# Patient Record
Sex: FEMALE | Employment: OTHER | URBAN - METROPOLITAN AREA
[De-identification: names, ages, dates, MRNs, and addresses within clinical notes are randomized per-mention and may not be internally consistent; named-entity substitution may affect disease eponyms.]

---

## 2019-12-17 ENCOUNTER — TRANSFERRED RECORDS (OUTPATIENT)
Dept: HEALTH INFORMATION MANAGEMENT | Facility: CLINIC | Age: 79
End: 2019-12-17

## 2019-12-18 ENCOUNTER — TRANSFERRED RECORDS (OUTPATIENT)
Dept: HEALTH INFORMATION MANAGEMENT | Facility: CLINIC | Age: 79
End: 2019-12-18

## 2020-01-15 ENCOUNTER — TRANSFERRED RECORDS (OUTPATIENT)
Dept: HEALTH INFORMATION MANAGEMENT | Facility: CLINIC | Age: 80
End: 2020-01-15

## 2020-01-23 ENCOUNTER — TRANSCRIBE ORDERS (OUTPATIENT)
Dept: OTHER | Age: 80
End: 2020-01-23

## 2020-01-23 DIAGNOSIS — C54.1 ENDOMETRIAL CANCER (H): Primary | ICD-10-CM

## 2020-01-23 NOTE — TELEPHONE ENCOUNTER
ONCOLOGY INTAKE: Records Information      APPT INFORMATION: [id number] Dr. Yuliet Wilburn  Referring provider:  Dr. Abby Parekh  Referring provider s clinic:  [facility]  Reason for visit/diagnosis:  Endometrial cancer (H) [C54.1]  Has patient been notified of appointment date and time?: Yes    RECORDS INFORMATION:  Were the records received with the referral (via [employer])? Yes    Has patient been seen for any external appt for this diagnosis? Yes    If yes, where? [facility]    Has patient had any imaging or procedures outside of [place] for this condition? Yes      If Yes, where? [facility]    ADDITIONAL INFORMATION:  None

## 2020-01-24 NOTE — TELEPHONE ENCOUNTER
Action    Action Taken January 24, 2020  Call to PT's daughter Yesica and she stated that all records are at [facility] in [place]  [phone]  Call to Danii, need to send the request to F# [phone]     RECORDS STATUS - ALL OTHER DIAGNOSIS      RECORDS RECEIVED FROM: [facility]   DATE RECEIVED: 1/24/20   NOTES STATUS DETAILS   OFFICE NOTE from referring provider Requesting 1/24/20  Received 1/28/20 [facility]   OFFICE NOTE from medical oncologist     DISCHARGE SUMMARY from hospital     DISCHARGE REPORT from the ER Received 1/28/20 [facility]   OPERATIVE REPORT Received 1/28/20 [facility]   MEDICATION LIST     CLINICAL TRIAL TREATMENTS TO DATE     LABS Report Received 1/28/20 [facility]   PATHOLOGY REPORTS     ANYTHING RELATED TO DIAGNOSIS     GENONOMIC TESTING     TYPE:     IMAGING (NEED IMAGES & REPORT)     CT SCANS     MRI     MAMMO     ULTRASOUND Report Received 1/28/20 [facility]   PET

## 2020-02-11 ENCOUNTER — ONCOLOGY VISIT (OUTPATIENT)
Dept: ONCOLOGY | Facility: CLINIC | Age: 80
End: 2020-02-11
Attending: OBSTETRICS & GYNECOLOGY
Payer: COMMERCIAL

## 2020-02-11 ENCOUNTER — PRE VISIT (OUTPATIENT)
Dept: ONCOLOGY | Facility: CLINIC | Age: 80
End: 2020-02-11

## 2020-02-11 ENCOUNTER — TELEPHONE (OUTPATIENT)
Dept: ONCOLOGY | Facility: CLINIC | Age: 80
End: 2020-02-11

## 2020-02-11 VITALS
DIASTOLIC BLOOD PRESSURE: 62 MMHG | HEART RATE: 65 BPM | WEIGHT: 195.9 LBS | BODY MASS INDEX: 36.05 KG/M2 | RESPIRATION RATE: 14 BRPM | TEMPERATURE: 98.2 F | HEIGHT: 62 IN | SYSTOLIC BLOOD PRESSURE: 139 MMHG | OXYGEN SATURATION: 97 %

## 2020-02-11 DIAGNOSIS — C54.1 ENDOMETRIAL CANCER (H): ICD-10-CM

## 2020-02-11 PROBLEM — R29.6 FREQUENT FALLS: Status: ACTIVE | Noted: 2017-09-11

## 2020-02-11 PROBLEM — J18.9 PNEUMONIA DUE TO INFECTIOUS ORGANISM: Status: ACTIVE | Noted: 2019-09-09

## 2020-02-11 PROBLEM — F03.B0 MODERATE DEMENTIA WITHOUT BEHAVIORAL DISTURBANCE (H): Status: ACTIVE | Noted: 2019-04-17

## 2020-02-11 PROBLEM — G47.09 OTHER INSOMNIA: Status: ACTIVE | Noted: 2019-04-17

## 2020-02-11 PROBLEM — F32.1 MODERATE SINGLE CURRENT EPISODE OF MAJOR DEPRESSIVE DISORDER (H): Status: ACTIVE | Noted: 2017-08-16

## 2020-02-11 PROBLEM — F01.50: Status: ACTIVE | Noted: 2019-07-11

## 2020-02-11 PROBLEM — R22.1 NECK SWELLING: Status: ACTIVE | Noted: 2020-02-11

## 2020-02-11 PROCEDURE — 99205 OFFICE O/P NEW HI 60 MIN: CPT | Mod: ZP | Performed by: OBSTETRICS & GYNECOLOGY

## 2020-02-11 PROCEDURE — G0463 HOSPITAL OUTPT CLINIC VISIT: HCPCS | Mod: ZF

## 2020-02-11 RX ORDER — PREDNISONE 1 MG/1
1 TABLET ORAL EVERY MORNING
COMMUNITY
Start: 2019-11-25

## 2020-02-11 RX ORDER — DONEPEZIL HYDROCHLORIDE 10 MG/1
10 TABLET, FILM COATED ORAL EVERY EVENING
COMMUNITY
Start: 2019-10-28

## 2020-02-11 RX ORDER — ALBUTEROL SULFATE 90 UG/1
2 AEROSOL, METERED RESPIRATORY (INHALATION) EVERY 4 HOURS PRN
COMMUNITY
Start: 2018-04-25

## 2020-02-11 RX ORDER — FAMOTIDINE 20 MG/1
TABLET, FILM COATED ORAL
Status: ON HOLD | COMMUNITY
Start: 2020-01-19 | End: 2020-03-05

## 2020-02-11 RX ORDER — NEOMYCIN SULFATE, POLYMYXIN B SULFATE AND HYDROCORTISONE 10; 3.5; 1 MG/ML; MG/ML; [USP'U]/ML
SUSPENSION/ DROPS AURICULAR (OTIC)
Status: ON HOLD | COMMUNITY
Start: 2019-12-19 | End: 2020-03-05

## 2020-02-11 RX ORDER — FUROSEMIDE 20 MG
20 TABLET ORAL WEEKLY
COMMUNITY
Start: 2018-04-25

## 2020-02-11 RX ORDER — METOPROLOL TARTRATE 50 MG
25 TABLET ORAL 2 TIMES DAILY
COMMUNITY
Start: 2018-04-25

## 2020-02-11 RX ORDER — ALBUTEROL SULFATE 0.83 MG/ML
2.5 SOLUTION RESPIRATORY (INHALATION) DAILY
COMMUNITY
Start: 2018-04-25

## 2020-02-11 RX ORDER — LANOLIN ALCOHOL/MO/W.PET/CERES
6 CREAM (GRAM) TOPICAL AT BEDTIME
COMMUNITY
Start: 2019-07-11

## 2020-02-11 RX ORDER — FEEDER CONTAINER WITH PUMP SET
1 EACH MISCELLANEOUS EVERY EVENING
COMMUNITY
Start: 2017-08-16

## 2020-02-11 ASSESSMENT — MIFFLIN-ST. JEOR: SCORE: 1311.34

## 2020-02-11 ASSESSMENT — PAIN SCALES - GENERAL: PAINLEVEL: NO PAIN (0)

## 2020-02-11 NOTE — TELEPHONE ENCOUNTER
Patients family wanted CT scan completed closer to home. They requested this be done at her [facility].     RN reached out to local clinic for help in getting this arranged.     CT scan order was faxed to them    Soo Lyle RN

## 2020-02-11 NOTE — LETTER
2020       RE: Enda Hilario  [address]  [address]  [facility] [address]     Dear Colleague,    Thank you for referring your patient, Edna Hilario, to the [facility]. Please see a copy of my visit note below.      Consult Notes on Referred Patient    Date: 2020     Dr. Abby Abbott MD  [facility]  [address] [place] [address]       RE: Edna Hilario  : 1940  PENELOPE: 2020    Dear Dr. Abby Abbott:    I had the pleasure of seeing your patient Edna Hilario [facility]. As you know she is a very pleasant 79 year old woman with a recent diagnosis of endometrial cancer.  Given these findings she was subsequently sent to the Gynecologic Cancer Clinic for new patient consultation.     Cancer Treatment History:  19 ED visit for PMB. US with ovoid hypoechoic central uterine mass 3.5x3.7x2.8 cm, EMS 6 mm. Hgb 9.8.  1/15/20 Hysteroscopy, D&C: thickened endometrium, no visible mass. 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    Interval History:   History is limited due to patient having dementia, largely provided by family members. Patient started having vaginal bleeding in November or 2019, which was heavy enough to soak through her sheets. She presented to the ED and was seen by a gynecologist, who performed a hysteroscopy, D&C. 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 Patient continues to have small amount of vaginal spotting but not daily. Denies abdominal pain. Has urinary incontinence at baseline. Denies diarrhea, will at times have constipation and need to strain. No weight loss, appetite changes, nausea/vomiting, night sweats or hot flashes. She has COPD and is on 2L O2 and prednisone daily.    Review of Systems:  Systemic: no weight changes; no fever; no chills; no night sweats; no appetite changes  Skin: no rashes, or lesions  Eye:  no irritation; no changes in vision  Prosper-Laryngeal: no dysphagia; no hoarseness   Pulmonary: yes - cough, shortness of breath associated with COPD  Cardiovascular: no chest pain; no palpitations  Gastrointestinal: yes - occasional constipation  Genitourinary: yes - vaginal bleeding, urinary incontinence. No frequency, urgency, dysuria.   Breast: no breast discharge; no breast changes; no breast pain  Musculoskeletal: yes - chronic left knee pain  Psychiatric: no depressed mood; no anxiety    Hematologic: no tender lymph nodes; no noticeable swellings or lumps   Endocrine: no hot flashes; no heat/cold intolerance         Neurological: no tremor; no numbness and tingling; no headaches; no difficulty  sleeping    OB-Gyn History:  ,  x4  Menopause age 48-50  No history of STI   Pap hx: unknown    Past Medical History:  HTN  HLD  COPD  GERD  Depression/anxiety  Atrial fibrillation  NSTEMI  DOMINICK  Arthritis   SAH  Dementia    Past Surgical History:  Surgery for brain aneurysm, cannot have MRI  Removal of neck mass  Eye surgery  D&C     Health Maintenance:  Health Maintenance Due   Topic Date Due     DEXA  1940     ADVANCE CARE PLANNING  1940     DTAP/TDAP/TD IMMUNIZATION (1 - Tdap) 1949     ZOSTER IMMUNIZATION (1 of 2) 1988     MEDICARE ANNUAL WELLNESS VISIT  2003     FALL RISK ASSESSMENT  2003     PNEUMOCOCCAL IMMUNIZATION 65+ LOW/MEDIUM RISK (1 of 2 - PCV13) 2003     Last Pap Smear: unknown  Last Mammogram: unknown  Last Colonoscopy: unknown                  Last DEXA Scan: unknown    Current Medications:   has a current medication list which includes the following prescription(s): albuterol, albuterol, donepezil, furosemide, melatonin, neomycin-polymyxin-hydrocortisone, prednisone, ranitidine, sertraline, trelegy ellipta, warfarin anticoagulant, famotidine, fluticasone-salmeterol, metoprolol tartrate, and ensure high protein.     Allergies:    Allergies   Allergen  "Reactions     Sulfamethoxazole-Trimethoprim Other (See Comments)      Social History:   Social History     Tobacco Use     Smoking status: Former Smoker     Smokeless tobacco: Never Used     Tobacco comment: Quit 09/2019   Substance Use Topics     Alcohol use: Not on file       History   Drug Use Not on file       Family History:   Sister - uterine cancer  Maternal aunt - breast cancer  No other gynecologic malignancy    Physical Exam:   /62 (BP Location: Right arm, Patient Position: Chair, Cuff Size: Adult Regular)   Pulse 65   Temp 98.2  F (36.8  C) (Oral)   Resp 14   Ht 1.582 m (5' 2.28\")   Wt 88.9 kg (195 lb 14.4 oz)   SpO2 97%   BMI 35.51 kg/m    Body mass index is 35.51 kg/m .    General Appearance: Sitting in wheelchair on O2, no acute distress  HEENT:  No palpable nodules or masses      Cardiovascular: Regular rate and rhythm  Respiratory: Lungs clear, no rales, rhonchi or wheezes  Musculoskeletal: Extremities non tender and with trace edema  Skin: No lesions or rashes   Neurological: Wheelchair bound, no focal deficits  Psychiatric: Appropriate mood and affect                            Gastrointestinal: Abdomen soft, non-tender, non-distended, no organomegaly or masses  Genitourinary: Deferred    Assessment:  Edna Hilario is a 79 year old woman with a new diagnosis of serous endometrial cancer.       A total of 60 minutes was spent with the patient, 50 minutes of which were spent in counseling the patient and/or treatment planning           Plan:   1.) Discussed pathology results and reviewed uterine anatomy with patient and her family. We reviewed often aggressive nature of grade 3 uterine cancer. Treatment options were reviewed including surgical management and/or chemotherapy. Patient has multiple medical comorbidities and is a poor surgical candidate. Discussed recommendation for CT imaging. This will further guide treatment recommendations. Plan for follow up after completion of CT " C/A/P.  Surgery at that time pending CT results.  If widely disseminated disease (ie. Pulmonary), may need to consider neoadjuvant chemotherapy followed by surgery given her functional status.     2.) Genetic risk factors were assessed and the patient does not meet the qualifications for a referral.      3.) Labs and/or tests ordered include: CT C/A/P.     4.) Health maintenance issues addressed today include: none    Yuliet Wilburn MD  Gynecologic Oncology  [facility]    MAGNOLIA HILLIARD      Again, thank you for allowing me to participate in the care of your patient.      Sincerely,    Yuliet Wilburn MD

## 2020-02-11 NOTE — PATIENT INSTRUCTIONS
CT scan followed by return visit with Akila for treatment planning    Yuliet Wilburn MD  Gynecologic Oncology  [facility]

## 2020-02-11 NOTE — NURSING NOTE
"Oncology Rooming Note    February 11, 2020 12:19 PM   Edna Hilario is a 79 year old female who presents for:    Chief Complaint   Patient presents with     Oncology Clinic Visit     New; Endometrial Ca     Initial Vitals: /62 (BP Location: Right arm, Patient Position: Chair, Cuff Size: Adult Regular)   Pulse 65   Temp 98.2  F (36.8  C) (Oral)   Resp 14   Ht 1.582 m (5' 2.28\")   Wt 88.9 kg (195 lb 14.4 oz)   SpO2 97%   BMI 35.51 kg/m   Estimated body mass index is 35.51 kg/m  as calculated from the following:    Height as of this encounter: 1.582 m (5' 2.28\").    Weight as of this encounter: 88.9 kg (195 lb 14.4 oz). Body surface area is 1.98 meters squared.  No Pain (0) Comment: Data Unavailable   No LMP recorded. Patient is postmenopausal.  Allergies reviewed: Yes  Medications reviewed: Yes    Medications: Medication refills not needed today.  Pharm[facility] entered into [facility] [place] [facility] [address]    Clinical concerns: Would like to discuss next steps after diagnosis.        Maddie Salcido, SALUD              "

## 2020-02-11 NOTE — PROGRESS NOTES
Consult Notes on Referred Patient    Date: 2020     Dr. Abby Abbott MD  [facility] [place] [address]       RE: Edna Hilario  : 1940  PENELOPE: 2020    Dear Dr. Abby Abbott:    I had the pleasure of seeing your patient Edna Hilario here at the Gynecologic Cancer Clinic at the [facility] on 2020. As you know she is a very pleasant 79 year old woman with a recent diagnosis of endometrial cancer.  Given these findings she was subsequently sent to the Gynecologic Cancer Clinic for new patient consultation.     Cancer Treatment History:  19 ED visit for PMB. US with ovoid hypoechoic central uterine mass 3.5x3.7x2.8 cm, EMS 6 mm. Hgb 9.8.  1/15/20 Hysteroscopy, D&C: thickened endometrium, no visible mass. 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    Interval History:   History is limited due to patient having dementia, largely provided by family members. Patient started having vaginal bleeding in November or 2019, which was heavy enough to soak through her sheets. She presented to the ED and was seen by a gynecologist, who performed a hysteroscopy, D&C. 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 Patient continues to have small amount of vaginal spotting but not daily. Denies abdominal pain. Has urinary incontinence at baseline. Denies diarrhea, will at times have constipation and need to strain. No weight loss, appetite changes, nausea/vomiting, night sweats or hot flashes. She has COPD and is on 2L O2 and prednisone daily.    Review of Systems:  Systemic: no weight changes; no fever; no chills; no night sweats; no appetite changes  Skin: no rashes, or lesions  Eye: no irritation; no changes in vision  Prosper-Laryngeal: no dysphagia; no hoarseness   Pulmonary: yes - cough, shortness of breath associated with COPD  Cardiovascular: no chest pain; no palpitations  Gastrointestinal: yes - occasional  constipation  Genitourinary: yes - vaginal bleeding, urinary incontinence. No frequency, urgency, dysuria.   Breast: no breast discharge; no breast changes; no breast pain  Musculoskeletal: yes - chronic left knee pain  Psychiatric: no depressed mood; no anxiety    Hematologic: no tender lymph nodes; no noticeable swellings or lumps   Endocrine: no hot flashes; no heat/cold intolerance         Neurological: no tremor; no numbness and tingling; no headaches; no difficulty  sleeping    OB-Gyn History:  ,  x4  Menopause age 48-50  No history of STI   Pap hx: unknown    Past Medical History:  HTN  HLD  COPD  GERD  Depression/anxiety  Atrial fibrillation  NSTEMI  DOMINICK  Arthritis   SAH  Dementia    Past Surgical History:  Surgery for brain aneurysm, cannot have MRI  Removal of neck mass  Eye surgery  D&C     Health Maintenance:  Health Maintenance Due   Topic Date Due     DEXA  1940     ADVANCE CARE PLANNING  1940     DTAP/TDAP/TD IMMUNIZATION (1 - Tdap) 1949     ZOSTER IMMUNIZATION (1 of 2) 1988     MEDICARE ANNUAL WELLNESS VISIT  2003     FALL RISK ASSESSMENT  2003     PNEUMOCOCCAL IMMUNIZATION 65+ LOW/MEDIUM RISK (1 of 2 - PCV13) 2003     Last Pap Smear: unknown  Last Mammogram: unknown  Last Colonoscopy: unknown                  Last DEXA Scan: unknown    Current Medications:   has a current medication list which includes the following prescription(s): albuterol, albuterol, donepezil, furosemide, melatonin, neomycin-polymyxin-hydrocortisone, prednisone, ranitidine, sertraline, trelegy ellipta, warfarin anticoagulant, famotidine, fluticasone-salmeterol, metoprolol tartrate, and ensure high protein.     Allergies:    Allergies   Allergen Reactions     Sulfamethoxazole-Trimethoprim Other (See Comments)      Social History:   Social History     Tobacco Use     Smoking status: Former Smoker     Smokeless tobacco: Never Used     Tobacco comment: Quit 2019   Substance  "Use Topics     Alcohol use: Not on file       History   Drug Use Not on file       Family History:   Sister - uterine cancer  Maternal aunt - breast cancer  No other gynecologic malignancy    Physical Exam:   /62 (BP Location: Right arm, Patient Position: Chair, Cuff Size: Adult Regular)   Pulse 65   Temp 98.2  F (36.8  C) (Oral)   Resp 14   Ht 1.582 m (5' 2.28\")   Wt 88.9 kg (195 lb 14.4 oz)   SpO2 97%   BMI 35.51 kg/m    Body mass index is 35.51 kg/m .    General Appearance: Sitting in wheelchair on O2, no acute distress  HEENT:  No palpable nodules or masses      Cardiovascular: Regular rate and rhythm  Respiratory: Lungs clear, no rales, rhonchi or wheezes  Musculoskeletal: Extremities non tender and with trace edema  Skin: No lesions or rashes   Neurological: Wheelchair bound, no focal deficits  Psychiatric: Appropriate mood and affect                            Gastrointestinal: Abdomen soft, non-tender, non-distended, no organomegaly or masses  Genitourinary: Deferred    Assessment:  Edna Hilario is a 79 year old woman with a new diagnosis of serous endometrial cancer.       A total of 60 minutes was spent with the patient, 50 minutes of which were spent in counseling the patient and/or treatment planning           Plan:   1.) Discussed pathology results and reviewed uterine anatomy with patient and her family. We reviewed often aggressive nature of grade 3 uterine cancer. Treatment options were reviewed including surgical management and/or chemotherapy. Patient has multiple medical comorbidities and is a poor surgical candidate. Discussed recommendation for CT imaging. This will further guide treatment recommendations. Plan for follow up after completion of CT C/A/P.  Surgery at that time pending CT results.  If widely disseminated disease (ie. Pulmonary), may need to consider neoadjuvant chemotherapy followed by surgery given her functional status.     2.) Genetic risk factors were assessed " and the patient does not meet the qualifications for a referral.      3.) Labs and/or tests ordered include: CT C/A/P.     4.) Health maintenance issues addressed today include: none    Yuliet Wilburn MD  Gynecologic Oncology  [facility]    CC  MAGNOLIA NUGENT

## 2020-02-21 ENCOUNTER — TRANSFERRED RECORDS (OUTPATIENT)
Dept: HEALTH INFORMATION MANAGEMENT | Facility: CLINIC | Age: 80
End: 2020-02-21

## 2020-02-21 PROBLEM — C54.1 ENDOMETRIAL CANCER (H): Status: ACTIVE | Noted: 2020-02-21

## 2020-03-04 RX ORDER — FLUCONAZOLE 100 MG/1
100 TABLET ORAL DAILY
COMMUNITY

## 2020-03-05 ENCOUNTER — ANESTHESIA EVENT (OUTPATIENT)
Dept: SURGERY | Facility: CLINIC | Age: 80
End: 2020-03-05
Payer: MEDICARE

## 2020-03-05 ENCOUNTER — HOSPITAL ENCOUNTER (OUTPATIENT)
Facility: CLINIC | Age: 80
LOS: 1 days | Discharge: HOME | End: 2020-03-06
Attending: OBSTETRICS & GYNECOLOGY | Admitting: OBSTETRICS & GYNECOLOGY
Payer: MEDICARE

## 2020-03-05 ENCOUNTER — ANESTHESIA (OUTPATIENT)
Dept: SURGERY | Facility: CLINIC | Age: 80
End: 2020-03-05
Payer: MEDICARE

## 2020-03-05 DIAGNOSIS — C54.1 ENDOMETRIAL CANCER (H): Primary | ICD-10-CM

## 2020-03-05 LAB
ABO + RH BLD: NORMAL
ABO + RH BLD: NORMAL
BLD GP AB SCN SERPL QL: NORMAL
BLD PROD TYP BPU: NORMAL
BLD UNIT ID BPU: 0
BLD UNIT ID BPU: 0
BLOOD BANK CMNT PATIENT-IMP: NORMAL
BLOOD PRODUCT CODE: NORMAL
BLOOD PRODUCT CODE: NORMAL
BPU ID: NORMAL
BPU ID: NORMAL
GLUCOSE BLDC GLUCOMTR-MCNC: 141 MG/DL (ref 70–99)
GLUCOSE BLDC GLUCOMTR-MCNC: 148 MG/DL (ref 70–99)
HGB BLD-MCNC: 7.8 G/DL (ref 11.7–15.7)
HGB BLD-MCNC: 8.3 G/DL (ref 11.7–15.7)
INR PPP: 1.42 (ref 0.86–1.14)
NUM BPU REQUESTED: 2
POTASSIUM SERPL-SCNC: 4.5 MMOL/L (ref 3.4–5.3)
SPECIMEN EXP DATE BLD: NORMAL
TRANSFUSION STATUS PATIENT QL: NORMAL

## 2020-03-05 PROCEDURE — 25000125 ZZHC RX 250: Performed by: OBSTETRICS & GYNECOLOGY

## 2020-03-05 PROCEDURE — 88305 TISSUE EXAM BY PATHOLOGIST: CPT | Mod: 26 | Performed by: OBSTETRICS & GYNECOLOGY

## 2020-03-05 PROCEDURE — 25000125 ZZHC RX 250: Performed by: NURSE ANESTHETIST, CERTIFIED REGISTERED

## 2020-03-05 PROCEDURE — 86900 BLOOD TYPING SEROLOGIC ABO: CPT | Performed by: ANESTHESIOLOGY

## 2020-03-05 PROCEDURE — 88309 TISSUE EXAM BY PATHOLOGIST: CPT | Mod: 26 | Performed by: OBSTETRICS & GYNECOLOGY

## 2020-03-05 PROCEDURE — 85014 HEMATOCRIT: CPT

## 2020-03-05 PROCEDURE — 27210794 ZZH OR GENERAL SUPPLY STERILE: Performed by: OBSTETRICS & GYNECOLOGY

## 2020-03-05 PROCEDURE — 25000128 H RX IP 250 OP 636: Performed by: OBSTETRICS & GYNECOLOGY

## 2020-03-05 PROCEDURE — 25800030 ZZH RX IP 258 OP 636: Performed by: NURSE ANESTHETIST, CERTIFIED REGISTERED

## 2020-03-05 PROCEDURE — 71000012 ZZH RECOVERY PHASE 1 LEVEL 1 FIRST HR: Performed by: OBSTETRICS & GYNECOLOGY

## 2020-03-05 PROCEDURE — 99207 ZZC CONSULT E&M CHANGED TO INITIAL LEVEL: CPT | Performed by: PHYSICIAN ASSISTANT

## 2020-03-05 PROCEDURE — 86850 RBC ANTIBODY SCREEN: CPT | Performed by: ANESTHESIOLOGY

## 2020-03-05 PROCEDURE — 88307 TISSUE EXAM BY PATHOLOGIST: CPT | Mod: 26 | Performed by: OBSTETRICS & GYNECOLOGY

## 2020-03-05 PROCEDURE — 86923 COMPATIBILITY TEST ELECTRIC: CPT | Performed by: ANESTHESIOLOGY

## 2020-03-05 PROCEDURE — 25800025 ZZH RX 258: Performed by: OBSTETRICS & GYNECOLOGY

## 2020-03-05 PROCEDURE — 00000155 ZZHCL STATISTIC H-CELL BLOCK W/STAIN: Performed by: OBSTETRICS & GYNECOLOGY

## 2020-03-05 PROCEDURE — P9016 RBC LEUKOCYTES REDUCED: HCPCS | Performed by: ANESTHESIOLOGY

## 2020-03-05 PROCEDURE — 86901 BLOOD TYPING SEROLOGIC RH(D): CPT | Performed by: ANESTHESIOLOGY

## 2020-03-05 PROCEDURE — 37000009 ZZH ANESTHESIA TECHNICAL FEE, EACH ADDTL 15 MIN: Performed by: OBSTETRICS & GYNECOLOGY

## 2020-03-05 PROCEDURE — 37000008 ZZH ANESTHESIA TECHNICAL FEE, 1ST 30 MIN: Performed by: OBSTETRICS & GYNECOLOGY

## 2020-03-05 PROCEDURE — 88309 TISSUE EXAM BY PATHOLOGIST: CPT | Performed by: OBSTETRICS & GYNECOLOGY

## 2020-03-05 PROCEDURE — 00000158 ZZHCL STATISTIC H-FISH PROCESS B/S: Performed by: OBSTETRICS & GYNECOLOGY

## 2020-03-05 PROCEDURE — 88377 M/PHMTRC ALYS ISHQUANT/SEMIQ: CPT | Performed by: PATHOLOGY

## 2020-03-05 PROCEDURE — 36000085 ZZH SURGERY LEVEL 8 1ST 30 MIN: Performed by: OBSTETRICS & GYNECOLOGY

## 2020-03-05 PROCEDURE — 36000087 ZZH SURGERY LEVEL 8 EA 15 ADDTL MIN: Performed by: OBSTETRICS & GYNECOLOGY

## 2020-03-05 PROCEDURE — 85610 PROTHROMBIN TIME: CPT | Performed by: OBSTETRICS & GYNECOLOGY

## 2020-03-05 PROCEDURE — 88305 TISSUE EXAM BY PATHOLOGIST: CPT | Performed by: OBSTETRICS & GYNECOLOGY

## 2020-03-05 PROCEDURE — 85018 HEMOGLOBIN: CPT | Performed by: OBSTETRICS & GYNECOLOGY

## 2020-03-05 PROCEDURE — 25000128 H RX IP 250 OP 636: Performed by: PHYSICIAN ASSISTANT

## 2020-03-05 PROCEDURE — 25000132 ZZH RX MED GY IP 250 OP 250 PS 637: Performed by: PHYSICIAN ASSISTANT

## 2020-03-05 PROCEDURE — 88112 CYTOPATH CELL ENHANCE TECH: CPT | Mod: 26 | Performed by: OBSTETRICS & GYNECOLOGY

## 2020-03-05 PROCEDURE — 88112 CYTOPATH CELL ENHANCE TECH: CPT | Performed by: OBSTETRICS & GYNECOLOGY

## 2020-03-05 PROCEDURE — 85018 HEMOGLOBIN: CPT | Performed by: ANESTHESIOLOGY

## 2020-03-05 PROCEDURE — 88307 TISSUE EXAM BY PATHOLOGIST: CPT | Performed by: OBSTETRICS & GYNECOLOGY

## 2020-03-05 PROCEDURE — 25000128 H RX IP 250 OP 636: Performed by: NURSE ANESTHETIST, CERTIFIED REGISTERED

## 2020-03-05 PROCEDURE — 71000013 ZZH RECOVERY PHASE 1 LEVEL 1 EA ADDTL HR: Performed by: OBSTETRICS & GYNECOLOGY

## 2020-03-05 PROCEDURE — 84132 ASSAY OF SERUM POTASSIUM: CPT

## 2020-03-05 PROCEDURE — 82962 GLUCOSE BLOOD TEST: CPT | Mod: 91

## 2020-03-05 PROCEDURE — 84295 ASSAY OF SERUM SODIUM: CPT

## 2020-03-05 PROCEDURE — 99219 ZZC INITIAL OBSERVATION CARE,LEVL II: CPT | Performed by: PHYSICIAN ASSISTANT

## 2020-03-05 PROCEDURE — 25000131 ZZH RX MED GY IP 250 OP 636 PS 637: Mod: GY | Performed by: PHYSICIAN ASSISTANT

## 2020-03-05 PROCEDURE — 82803 BLOOD GASES ANY COMBINATION: CPT

## 2020-03-05 PROCEDURE — 25000566 ZZH SEVOFLURANE, EA 15 MIN: Performed by: OBSTETRICS & GYNECOLOGY

## 2020-03-05 PROCEDURE — 40000170 ZZH STATISTIC PRE-PROCEDURE ASSESSMENT II: Performed by: OBSTETRICS & GYNECOLOGY

## 2020-03-05 PROCEDURE — 25800030 ZZH RX IP 258 OP 636: Performed by: ANESTHESIOLOGY

## 2020-03-05 PROCEDURE — 25000132 ZZH RX MED GY IP 250 OP 250 PS 637: Performed by: OBSTETRICS & GYNECOLOGY

## 2020-03-05 PROCEDURE — 88342 IMHCHEM/IMCYTCHM 1ST ANTB: CPT | Performed by: OBSTETRICS & GYNECOLOGY

## 2020-03-05 PROCEDURE — 84132 ASSAY OF SERUM POTASSIUM: CPT | Performed by: OBSTETRICS & GYNECOLOGY

## 2020-03-05 PROCEDURE — 00000159 ZZHCL STATISTIC H-SEND OUTS PREP: Performed by: OBSTETRICS & GYNECOLOGY

## 2020-03-05 RX ORDER — METOPROLOL TARTRATE 25 MG/1
25 TABLET, FILM COATED ORAL 2 TIMES DAILY
Status: DISCONTINUED | OUTPATIENT
Start: 2020-03-05 | End: 2020-03-06 | Stop reason: HOSPADM

## 2020-03-05 RX ORDER — CEFAZOLIN SODIUM 1 G/3ML
1 INJECTION, POWDER, FOR SOLUTION INTRAMUSCULAR; INTRAVENOUS SEE ADMIN INSTRUCTIONS
Status: DISCONTINUED | OUTPATIENT
Start: 2020-03-05 | End: 2020-03-05 | Stop reason: HOSPADM

## 2020-03-05 RX ORDER — POLYETHYLENE GLYCOL 3350 17 G/17G
17 POWDER, FOR SOLUTION ORAL DAILY PRN
Status: DISCONTINUED | OUTPATIENT
Start: 2020-03-05 | End: 2020-03-06 | Stop reason: HOSPADM

## 2020-03-05 RX ORDER — PREDNISONE 1 MG/1
1 TABLET ORAL EVERY MORNING
Status: DISCONTINUED | OUTPATIENT
Start: 2020-03-05 | End: 2020-03-06 | Stop reason: HOSPADM

## 2020-03-05 RX ORDER — FENTANYL CITRATE 50 UG/ML
25-50 INJECTION, SOLUTION INTRAMUSCULAR; INTRAVENOUS
Status: DISCONTINUED | OUTPATIENT
Start: 2020-03-05 | End: 2020-03-05 | Stop reason: HOSPADM

## 2020-03-05 RX ORDER — HYDROMORPHONE HYDROCHLORIDE 1 MG/ML
0.2 INJECTION, SOLUTION INTRAMUSCULAR; INTRAVENOUS; SUBCUTANEOUS
Status: DISCONTINUED | OUTPATIENT
Start: 2020-03-05 | End: 2020-03-06 | Stop reason: HOSPADM

## 2020-03-05 RX ORDER — BISACODYL 10 MG
10 SUPPOSITORY, RECTAL RECTAL DAILY PRN
Status: DISCONTINUED | OUTPATIENT
Start: 2020-03-05 | End: 2020-03-06 | Stop reason: HOSPADM

## 2020-03-05 RX ORDER — MAGNESIUM HYDROXIDE 1200 MG/15ML
LIQUID ORAL PRN
Status: DISCONTINUED | OUTPATIENT
Start: 2020-03-05 | End: 2020-03-05 | Stop reason: HOSPADM

## 2020-03-05 RX ORDER — PROPOFOL 10 MG/ML
INJECTION, EMULSION INTRAVENOUS PRN
Status: DISCONTINUED | OUTPATIENT
Start: 2020-03-05 | End: 2020-03-05

## 2020-03-05 RX ORDER — FLUCONAZOLE 100 MG/1
100 TABLET ORAL DAILY
Status: DISCONTINUED | OUTPATIENT
Start: 2020-03-05 | End: 2020-03-06 | Stop reason: HOSPADM

## 2020-03-05 RX ORDER — DEXAMETHASONE SODIUM PHOSPHATE 4 MG/ML
INJECTION, SOLUTION INTRA-ARTICULAR; INTRALESIONAL; INTRAMUSCULAR; INTRAVENOUS; SOFT TISSUE PRN
Status: DISCONTINUED | OUTPATIENT
Start: 2020-03-05 | End: 2020-03-05

## 2020-03-05 RX ORDER — LANOLIN ALCOHOL/MO/W.PET/CERES
3 CREAM (GRAM) TOPICAL
Status: DISCONTINUED | OUTPATIENT
Start: 2020-03-05 | End: 2020-03-06 | Stop reason: HOSPADM

## 2020-03-05 RX ORDER — ACETAMINOPHEN 325 MG/1
650 TABLET ORAL EVERY 4 HOURS PRN
Status: DISCONTINUED | OUTPATIENT
Start: 2020-03-05 | End: 2020-03-06 | Stop reason: HOSPADM

## 2020-03-05 RX ORDER — ACETAMINOPHEN 325 MG/1
975 TABLET ORAL ONCE
Status: COMPLETED | OUTPATIENT
Start: 2020-03-05 | End: 2020-03-05

## 2020-03-05 RX ORDER — NALOXONE HYDROCHLORIDE 0.4 MG/ML
.1-.4 INJECTION, SOLUTION INTRAMUSCULAR; INTRAVENOUS; SUBCUTANEOUS
Status: DISCONTINUED | OUTPATIENT
Start: 2020-03-05 | End: 2020-03-05

## 2020-03-05 RX ORDER — OXYCODONE HYDROCHLORIDE 5 MG/1
5-10 TABLET ORAL
Qty: 10 TABLET | Refills: 0 | Status: SHIPPED | OUTPATIENT
Start: 2020-03-05

## 2020-03-05 RX ORDER — CEFAZOLIN SODIUM 2 G/100ML
2 INJECTION, SOLUTION INTRAVENOUS
Status: COMPLETED | OUTPATIENT
Start: 2020-03-05 | End: 2020-03-05

## 2020-03-05 RX ORDER — LIDOCAINE HYDROCHLORIDE 20 MG/ML
INJECTION, SOLUTION INFILTRATION; PERINEURAL PRN
Status: DISCONTINUED | OUTPATIENT
Start: 2020-03-05 | End: 2020-03-05

## 2020-03-05 RX ORDER — HYDROMORPHONE HYDROCHLORIDE 1 MG/ML
.3-.5 INJECTION, SOLUTION INTRAMUSCULAR; INTRAVENOUS; SUBCUTANEOUS EVERY 5 MIN PRN
Status: DISCONTINUED | OUTPATIENT
Start: 2020-03-05 | End: 2020-03-05 | Stop reason: HOSPADM

## 2020-03-05 RX ORDER — BUPIVACAINE HYDROCHLORIDE AND EPINEPHRINE 5; 5 MG/ML; UG/ML
INJECTION, SOLUTION PERINEURAL PRN
Status: DISCONTINUED | OUTPATIENT
Start: 2020-03-05 | End: 2020-03-05 | Stop reason: HOSPADM

## 2020-03-05 RX ORDER — ONDANSETRON 4 MG/1
4 TABLET, ORALLY DISINTEGRATING ORAL EVERY 6 HOURS PRN
Status: DISCONTINUED | OUTPATIENT
Start: 2020-03-05 | End: 2020-03-06 | Stop reason: HOSPADM

## 2020-03-05 RX ORDER — ONDANSETRON 2 MG/ML
4 INJECTION INTRAMUSCULAR; INTRAVENOUS EVERY 6 HOURS PRN
Status: DISCONTINUED | OUTPATIENT
Start: 2020-03-05 | End: 2020-03-06 | Stop reason: HOSPADM

## 2020-03-05 RX ORDER — DONEPEZIL HYDROCHLORIDE 10 MG/1
10 TABLET, FILM COATED ORAL EVERY EVENING
Status: DISCONTINUED | OUTPATIENT
Start: 2020-03-05 | End: 2020-03-06 | Stop reason: HOSPADM

## 2020-03-05 RX ORDER — FAMOTIDINE 20 MG/1
20 TABLET, FILM COATED ORAL 2 TIMES DAILY
Status: DISCONTINUED | OUTPATIENT
Start: 2020-03-05 | End: 2020-03-06 | Stop reason: HOSPADM

## 2020-03-05 RX ORDER — ONDANSETRON 2 MG/ML
4 INJECTION INTRAMUSCULAR; INTRAVENOUS EVERY 30 MIN PRN
Status: DISCONTINUED | OUTPATIENT
Start: 2020-03-05 | End: 2020-03-05 | Stop reason: HOSPADM

## 2020-03-05 RX ORDER — ONDANSETRON 2 MG/ML
INJECTION INTRAMUSCULAR; INTRAVENOUS PRN
Status: DISCONTINUED | OUTPATIENT
Start: 2020-03-05 | End: 2020-03-05

## 2020-03-05 RX ORDER — ACETAMINOPHEN 650 MG/1
650 SUPPOSITORY RECTAL EVERY 4 HOURS PRN
Status: DISCONTINUED | OUTPATIENT
Start: 2020-03-05 | End: 2020-03-06

## 2020-03-05 RX ORDER — GLYCOPYRROLATE 0.2 MG/ML
INJECTION, SOLUTION INTRAMUSCULAR; INTRAVENOUS PRN
Status: DISCONTINUED | OUTPATIENT
Start: 2020-03-05 | End: 2020-03-05

## 2020-03-05 RX ORDER — SODIUM CHLORIDE, SODIUM LACTATE, POTASSIUM CHLORIDE, CALCIUM CHLORIDE 600; 310; 30; 20 MG/100ML; MG/100ML; MG/100ML; MG/100ML
INJECTION, SOLUTION INTRAVENOUS CONTINUOUS
Status: DISCONTINUED | OUTPATIENT
Start: 2020-03-05 | End: 2020-03-05 | Stop reason: HOSPADM

## 2020-03-05 RX ORDER — CEFAZOLIN SODIUM 2 G/100ML
2 INJECTION, SOLUTION INTRAVENOUS EVERY 8 HOURS
Status: COMPLETED | OUTPATIENT
Start: 2020-03-05 | End: 2020-03-06

## 2020-03-05 RX ORDER — LIDOCAINE 40 MG/G
CREAM TOPICAL
Status: DISCONTINUED | OUTPATIENT
Start: 2020-03-05 | End: 2020-03-06 | Stop reason: HOSPADM

## 2020-03-05 RX ORDER — AMOXICILLIN 250 MG
2 CAPSULE ORAL 2 TIMES DAILY PRN
Status: DISCONTINUED | OUTPATIENT
Start: 2020-03-05 | End: 2020-03-06 | Stop reason: HOSPADM

## 2020-03-05 RX ORDER — ALBUTEROL SULFATE 0.83 MG/ML
2.5 SOLUTION RESPIRATORY (INHALATION) DAILY
Status: DISCONTINUED | OUTPATIENT
Start: 2020-03-05 | End: 2020-03-06 | Stop reason: HOSPADM

## 2020-03-05 RX ORDER — ONDANSETRON 4 MG/1
4 TABLET, ORALLY DISINTEGRATING ORAL EVERY 30 MIN PRN
Status: DISCONTINUED | OUTPATIENT
Start: 2020-03-05 | End: 2020-03-05 | Stop reason: HOSPADM

## 2020-03-05 RX ORDER — NALOXONE HYDROCHLORIDE 0.4 MG/ML
.1-.4 INJECTION, SOLUTION INTRAMUSCULAR; INTRAVENOUS; SUBCUTANEOUS
Status: DISCONTINUED | OUTPATIENT
Start: 2020-03-05 | End: 2020-03-06 | Stop reason: HOSPADM

## 2020-03-05 RX ORDER — ALBUTEROL SULFATE 90 UG/1
2 AEROSOL, METERED RESPIRATORY (INHALATION) EVERY 4 HOURS PRN
Status: DISCONTINUED | OUTPATIENT
Start: 2020-03-05 | End: 2020-03-06 | Stop reason: HOSPADM

## 2020-03-05 RX ORDER — FENTANYL CITRATE 50 UG/ML
INJECTION, SOLUTION INTRAMUSCULAR; INTRAVENOUS PRN
Status: DISCONTINUED | OUTPATIENT
Start: 2020-03-05 | End: 2020-03-05

## 2020-03-05 RX ORDER — EPHEDRINE SULFATE 50 MG/ML
INJECTION, SOLUTION INTRAMUSCULAR; INTRAVENOUS; SUBCUTANEOUS PRN
Status: DISCONTINUED | OUTPATIENT
Start: 2020-03-05 | End: 2020-03-05

## 2020-03-05 RX ORDER — AMOXICILLIN 250 MG
1-2 CAPSULE ORAL 2 TIMES DAILY
Qty: 30 TABLET | Refills: 0 | Status: SHIPPED | OUTPATIENT
Start: 2020-03-05

## 2020-03-05 RX ORDER — SODIUM CHLORIDE 9 MG/ML
INJECTION, SOLUTION INTRAVENOUS CONTINUOUS PRN
Status: DISCONTINUED | OUTPATIENT
Start: 2020-03-05 | End: 2020-03-05

## 2020-03-05 RX ORDER — OXYCODONE HYDROCHLORIDE 5 MG/1
5-10 TABLET ORAL
Status: DISCONTINUED | OUTPATIENT
Start: 2020-03-05 | End: 2020-03-06 | Stop reason: HOSPADM

## 2020-03-05 RX ORDER — INDOCYANINE GREEN AND WATER 25 MG
KIT INJECTION PRN
Status: DISCONTINUED | OUTPATIENT
Start: 2020-03-05 | End: 2020-03-05 | Stop reason: HOSPADM

## 2020-03-05 RX ORDER — AMOXICILLIN 250 MG
1 CAPSULE ORAL 2 TIMES DAILY PRN
Status: DISCONTINUED | OUTPATIENT
Start: 2020-03-05 | End: 2020-03-06 | Stop reason: HOSPADM

## 2020-03-05 RX ORDER — PROCHLORPERAZINE MALEATE 5 MG
5 TABLET ORAL EVERY 6 HOURS PRN
Status: DISCONTINUED | OUTPATIENT
Start: 2020-03-05 | End: 2020-03-06 | Stop reason: HOSPADM

## 2020-03-05 RX ADMIN — GLYCOPYRROLATE 0.2 MG: 0.2 INJECTION, SOLUTION INTRAMUSCULAR; INTRAVENOUS at 10:26

## 2020-03-05 RX ADMIN — SERTRALINE HYDROCHLORIDE 50 MG: 50 TABLET ORAL at 19:35

## 2020-03-05 RX ADMIN — PROPOFOL 100 MG: 10 INJECTION, EMULSION INTRAVENOUS at 09:37

## 2020-03-05 RX ADMIN — Medication 5 MG: at 11:42

## 2020-03-05 RX ADMIN — LIDOCAINE HYDROCHLORIDE 80 MG: 20 INJECTION, SOLUTION INFILTRATION; PERINEURAL at 09:37

## 2020-03-05 RX ADMIN — PREDNISONE 1 MG: 1 TABLET ORAL at 15:44

## 2020-03-05 RX ADMIN — Medication 1 LOZENGE: at 19:35

## 2020-03-05 RX ADMIN — ONDANSETRON 4 MG: 2 INJECTION INTRAMUSCULAR; INTRAVENOUS at 11:10

## 2020-03-05 RX ADMIN — CEFAZOLIN SODIUM 2 G: 2 INJECTION, SOLUTION INTRAVENOUS at 10:03

## 2020-03-05 RX ADMIN — CEFAZOLIN SODIUM 2 G: 2 INJECTION, SOLUTION INTRAVENOUS at 18:54

## 2020-03-05 RX ADMIN — UMECLIDINIUM BROMIDE AND VILANTEROL TRIFENATATE 1 PUFF: 62.5; 25 POWDER RESPIRATORY (INHALATION) at 15:43

## 2020-03-05 RX ADMIN — ROCURONIUM BROMIDE 50 MG: 10 INJECTION INTRAVENOUS at 09:38

## 2020-03-05 RX ADMIN — SODIUM CHLORIDE, POTASSIUM CHLORIDE, SODIUM LACTATE AND CALCIUM CHLORIDE: 600; 310; 30; 20 INJECTION, SOLUTION INTRAVENOUS at 08:22

## 2020-03-05 RX ADMIN — FENTANYL CITRATE 100 MCG: 50 INJECTION, SOLUTION INTRAMUSCULAR; INTRAVENOUS at 09:37

## 2020-03-05 RX ADMIN — HYDROMORPHONE HYDROCHLORIDE 0.5 MG: 1 INJECTION, SOLUTION INTRAMUSCULAR; INTRAVENOUS; SUBCUTANEOUS at 10:15

## 2020-03-05 RX ADMIN — PROPOFOL 50 MG: 10 INJECTION, EMULSION INTRAVENOUS at 10:07

## 2020-03-05 RX ADMIN — DEXAMETHASONE SODIUM PHOSPHATE 4 MG: 4 INJECTION, SOLUTION INTRA-ARTICULAR; INTRALESIONAL; INTRAMUSCULAR; INTRAVENOUS; SOFT TISSUE at 10:36

## 2020-03-05 RX ADMIN — DONEPEZIL HYDROCHLORIDE 10 MG: 10 TABLET ORAL at 19:35

## 2020-03-05 RX ADMIN — SODIUM CHLORIDE: 9 INJECTION, SOLUTION INTRAVENOUS at 09:44

## 2020-03-05 RX ADMIN — NICARDIPINE HYDROCHLORIDE 10 MG/HR: 2.5 INJECTION INTRAVENOUS at 10:34

## 2020-03-05 RX ADMIN — ROCURONIUM BROMIDE 20 MG: 10 INJECTION INTRAVENOUS at 10:21

## 2020-03-05 RX ADMIN — PHENYLEPHRINE HYDROCHLORIDE 100 MCG: 10 INJECTION INTRAVENOUS at 11:43

## 2020-03-05 RX ADMIN — FAMOTIDINE 20 MG: 20 TABLET, FILM COATED ORAL at 22:11

## 2020-03-05 RX ADMIN — FLUCONAZOLE 100 MG: 100 TABLET ORAL at 14:50

## 2020-03-05 RX ADMIN — PROPOFOL 20 MG: 10 INJECTION, EMULSION INTRAVENOUS at 10:08

## 2020-03-05 RX ADMIN — SUGAMMADEX 200 MG: 100 INJECTION, SOLUTION INTRAVENOUS at 11:48

## 2020-03-05 RX ADMIN — METOPROLOL TARTRATE 25 MG: 25 TABLET ORAL at 22:11

## 2020-03-05 RX ADMIN — PROPOFOL 30 MG: 10 INJECTION, EMULSION INTRAVENOUS at 10:11

## 2020-03-05 RX ADMIN — DEXMEDETOMIDINE HYDROCHLORIDE 0.5 MCG/KG/HR: 100 INJECTION, SOLUTION INTRAVENOUS at 09:43

## 2020-03-05 RX ADMIN — PROPOFOL 50 MG: 10 INJECTION, EMULSION INTRAVENOUS at 10:15

## 2020-03-05 RX ADMIN — PROPOFOL 50 MG: 10 INJECTION, EMULSION INTRAVENOUS at 09:47

## 2020-03-05 RX ADMIN — ACETAMINOPHEN 975 MG: 325 TABLET, FILM COATED ORAL at 07:34

## 2020-03-05 ASSESSMENT — COPD QUESTIONNAIRES
COPD: 1
CAT_SEVERITY: SEVERE

## 2020-03-05 ASSESSMENT — MIFFLIN-ST. JEOR: SCORE: 1326.35

## 2020-03-05 ASSESSMENT — LIFESTYLE VARIABLES: TOBACCO_USE: 1

## 2020-03-05 ASSESSMENT — ENCOUNTER SYMPTOMS: DYSRHYTHMIAS: 1

## 2020-03-05 NOTE — DISCHARGE SUMMARY
"HOSPITAL DISCHARGE SUMMARY    Patient Name: Edna Hilario  YOB: 1940 Age: 79 year old  Medical Record Number: [id number]  Primary Physician: No Ref-Primary, Physician  Phone: None  Admission Date: 3/5/2020  Discharge Date: 3/6/20    Edna Hilario  will be discharged from [facility] to Home.    PRINCIPAL DISCHARGE DIAGNOSIS: Endometrial cancer    BRIEF HOSPITAL COURSE: This 79 year old female admitted following robotic assisted laparoscopic total hysterectomy and bilateral salpingo-oophorectomy with staging. She tolerated the procedure well. Hospitalist was consulted post-op for management of medical comorbidities. Uneventful post operative course and discharge to home on POD #1 with adequate pain control, tolerating orals, voiding and ambulating.    PROCEDURES PERFORMED DURING HOSPITALIZATION:   Robotic assisted laparoscopic  Total hysterectomy  Bilateral salpingo-oophorectomy  Bilateral pelvic and para-aortic lymph node dissection    COMPLICATIONS IN HOSPITAL: None    CONSULTATIONS:  Internal medicine    PERTINENT FINDINGS/RESULTS AT DISCHARGE:   /42 (BP Location: Left arm)   Pulse (!) 49   Temp 96.4  F (35.8  C) (Oral)   Resp 14   Ht 1.575 m (5' 2\")   Wt 90.8 kg (200 lb 3.2 oz)   SpO2 96%   BMI 36.62 kg/m      Latest Laboratory Results:  Chem:  CBC RESULTS:   Recent Labs   Lab Test 03/05/20  1233   HGB 8.3*     Last Basic Metabolic Panel:  No results found for: NA   Lab Results   Component Value Date    POTASSIUM 4.5 03/05/2020     No results found for: CHLORIDE  No results found for: LYNDSEY  No results found for: CO2  No results found for: BUN  No results found for: CR  No results found for: GLC      IMPORTANT PENDING TEST RESULTS:  Pathology    CONDITION AT DISCHARGE:    Stabilized    DISCHARGE ORDERS  Current Discharge Medication List      START taking these medications    Details   oxyCODONE (ROXICODONE) 5 MG tablet Take 1-2 tablets (5-10 mg) by mouth every 3 hours as " needed for pain (Moderate to Severe)  Qty: 10 tablet, Refills: 0    Associated Diagnoses: Endometrial cancer (H)      senna-docusate (SENOKOT-S/PERICOLACE) 8.6-50 MG tablet Take 1-2 tablets by mouth 2 times daily Take while on oral narcotics to prevent or treat constipation.  Qty: 30 tablet, Refills: 0    Comments: While on narcotics  Associated Diagnoses: Endometrial cancer (H)         CONTINUE these medications which have NOT CHANGED    Details   albuterol (PROAIR HFA/PROVENTIL HFA/VENTOLIN HFA) 108 (90 Base) MCG/ACT inhaler Inhale 2 puffs into the lungs every 4 hours as needed     Comments: Pharmacy may dispense brand covered by insurance (Proair, or proventil or ventolin or generic albuterol inhaler)      albuterol (PROVENTIL) (2.5 MG/3ML) 0.083% neb solution Take 2.5 mg by nebulization daily IN THE AFTERNOON.      donepezil (ARICEPT) 10 MG tablet Take 10 mg by mouth every evening AT 1930.      fluconazole (DIFLUCAN) 100 MG tablet Take 100 mg by mouth daily For 14 days      furosemide (LASIX) 20 MG tablet Take 20 mg by mouth once a week AT 0730 ON MONDAYS.      melatonin 3 MG tablet Take 6 mg by mouth At Bedtime AT 1930.  (3MG X 2 = 6MG)      metoprolol tartrate (LOPRESSOR) 50 MG tablet Take 25 mg by mouth 2 times daily (takes 0.5 x 50mg = 25mg)      Nutritional Supplements (ENSURE HIGH PROTEIN) Take 1 Can by mouth every evening AT 1930.      predniSONE (DELTASONE) 1 MG tablet Take 1 mg by mouth every morning AT 0730.      ranitidine (ZANTAC) 150 MG tablet Take 150 mg by mouth 2 times daily       sertraline (ZOLOFT) 50 MG tablet Take 50 mg by mouth every evening AT 1930.      umeclidinium-vilanterol (ANORO ELLIPTA) 62.5-25 MCG/INH oral inhaler Inhale 1 puff into the lungs daily AT 0730.      WARFARIN SODIUM PO Take 5-7.5 mg by mouth daily Take 7.5mg by mouth every Wednesday at 1930.  Take 5mg by mouth all other days (Sunday, Monday, Tuesday, Thursday, Friday, Saturday) at 1930.             DISCHARGE  INSTRUCTION.  Discharge instructions following your gynecologic procedure:  Returning to work/activities:  -Nothing per vagina for 8 weeks  -Typically patients can expect to return to light work within 2-4 weeks.  -No driving or operating machinery while taking prescription pain medication.  -You should avoid heavy lifting until your follow up visit. No lifting greater than 20 pounds for 2 weeks.     Diet:  -as tolerated    Pain:  -Motrin (or other NSAIDs) are a good additional therapy and recommend trying this in addition to other pain relievers.  -Typically there is a minimal to moderate amount of incisional pain after surgery.  - An ice pack is recommended intermittently for the first 48 hours to help reduce pain & swelling.  -Most patients are provided a prescription for medication to take on a short term basis to help relieve the discomfort.  -If pain medication refills are needed we require you contact our office during regular business hours. (8am-5pm Monday-Friday)  -Please be aware that pain medication can cause constipation.  It may be recommended to take an over the counter stool softener as directed to prevent constipation.     Constipation:  -The pain medication you are prescribed at the time of your surgery can cause constipation.   -We recommend that you take an over the counter stool softener such as colace or senokot-s on a regular basis until you have stopped the pain medication or bowel movements are regular. You make take 1-4 tablets twice per day.   -For constipation lasting 3 days please take Milk of Magnesia or Miralax as directed on the bottles. If you have taken Milk of Magnesia and Miralax and still have not had a bowel movement please contact your our office.    Incision/Wound care:  -Leave your steri-strips in place until your post op visit.   -You many shower 24hrs after surgery.  It is ok to get the steri-strips/incision wet while showering & pat the area dry with a clean towel  -No  bathtubs, hot tubs or swimming is recommended for at least 2 weeks.    Vaginal drainage/spotting:  -Following a hysterectomy you may have vaginal drainage/spotting for up to 4-6 weeks from surgery. If more than a regular period please call our office.     Bladder symptoms:  -You may also have bladder irritation of difficulty starting urination from your surgery and this is normal. Please call if you have pain or burning when you urinate or fevers.     Follow up care:  -You will be asked to see your regular doctor in 1 wk and Dr. Bunch in 8 weeks or sooner if felt needed.   -If you don't already have an appointment, please contact the office and our staff will happily assist you in scheduling your appointment. Bring your insurance card & government issued ID card to your appointment.    CALL YOUR SURGEON @[phone] FOR ANY OF THE FOLLOWING:  -Temperature greater than 101 degrees Fahrenheit  -Increased pain  -Increased shortness of breath  -Increased bleeding or drainage or vaginal bleeding greater than a regular menses.   -Pus-like drainage, increasing redness, swelling, tenderness, or warmth at the incision site  -Persistent nausea or vomiting      FOLLOW-UP: Edna Hilario should see No Ref-Primary, Physician PRN.    Specialty follow-up: as above.     AFTER HOSPITAL RECOMMENDATIONS  As above.      Physician(s) in addition to primary physician who should receive a copy:  No Ref-Primary, Physician       Kristan Hopper PA-C

## 2020-03-05 NOTE — ANESTHESIA PROCEDURE NOTES
ARTERIAL LINE PROCEDURE NOTE:   Pre-Procedure  Performed by Elia Huddleston MD  Location: pre-op      Pre-Anesthestic Checklist: patient identified, IV checked, site marked, risks and benefits discussed, informed consent, monitors and equipment checked and pre-op evaluation    Timeout  Correct Patient: Yes   Correct Procedure: Yes   Correct Site: Yes   Correct Laterality: Yes   Correct Position: Yes   Site Marked: Yes   .   Procedure Documentation  Procedure: arterial line      Insertion Site:radial.. .  Patient Prep/Sterile Barriers; all elements of maximal sterile barrier technique followed, mask, hat, sterile gown, sterile gloves, draped, hand hygiene, chlorhexidine gluconate and isopropyl alcohol    Assessment/Narrative    Catheter: 20 gauge, 12 cm      Tegaderm dressing used.

## 2020-03-05 NOTE — CONSULTS
Consult Date:  03/05/2020      PRIMARY CARE PROVIDER:  Dr. Behrens      CONSULTING PHYSICIAN:  AYANNA Bunch MD.      REASON FOR CONSULTATION:  Medical co-management.      HISTORY OF PRESENT ILLNESS:  Edna Hilario is an 79-year-old female with past medical history of paroxysmal atrial fibrillation on chronic anticoagulation with Coumadin, hypertension, osteoarthritis, GERD, DOMINICK, and dyslipidemia who initially developed vaginal bleeding in 12/2019.  Bleeding was extensive, last warranted ultrasound which revealed a 3.7 cm mass in the central uterus.  She was referred to Dr. Reveles for Gynecology who did a hysteroscopy and she was found to have endometrial cancer.  Today, she underwent a robotic total laparoscopic hysterectomy and bilateral salpingo-oophorectomy.  General anesthesia was used.      In the immediate postoperative period, the patient is resting comfortably in bed.  She denies any nausea or vomiting.  No active chest pain, shortness of breath.  No recent medication changes.  Compliant with medication regimen.  She has no acute concerns at this time.  She is alert to self and can identify that she is in the hospital, but not the specific name, and identifies the year is 2020.  Appears forgetful, and do note moderate dementia in her preoperative assessment.      REVIEW OF SYSTEMS:  A 10-point review of systems was performed and is otherwise negative unless specified in the HPI.      PAST MEDICAL HISTORY:   1.  Hypertension.   2.  Osteoarthritis.   3.  GERD.   4.  Paroxysmal atrial fibrillation, on anticoagulation with Coumadin.   5.  Obstructive sleep apnea.   6.  Prediabetes.   7.  Depression.   8.  Moderate dementia without behavioral disturbance.   9.  Insomnia.      MEDICATIONS:    Medications Prior to Admission   Medication Sig Dispense Refill Last Dose     albuterol (PROAIR HFA/PROVENTIL HFA/VENTOLIN HFA) 108 (90 Base) MCG/ACT inhaler Inhale 2 puffs into the lungs every 4 hours as needed     more than a week at prn     albuterol (PROVENTIL) (2.5 MG/3ML) 0.083% neb solution Take 2.5 mg by nebulization daily IN THE AFTERNOON.   3/4/2020 at pm     donepezil (ARICEPT) 10 MG tablet Take 10 mg by mouth every evening AT 1930.   3/4/2020 at pm     fluconazole (DIFLUCAN) 100 MG tablet Take 100 mg by mouth daily For 14 days   3/4/2020 at Unknown time     furosemide (LASIX) 20 MG tablet Take 20 mg by mouth once a week AT 0730 ON MONDAYS.   3/2/2020     melatonin 3 MG tablet Take 6 mg by mouth At Bedtime AT 1930.  (3MG X 2 = 6MG)   3/4/2020 at pm     metoprolol tartrate (LOPRESSOR) 50 MG tablet Take 25 mg by mouth 2 times daily (takes 0.5 x 50mg = 25mg)   3/4/2020 at pm     Nutritional Supplements (ENSURE HIGH PROTEIN) Take 1 Can by mouth every evening AT 1930.   3/4/2020 at Unknown time     predniSONE (DELTASONE) 1 MG tablet Take 1 mg by mouth every morning AT 0730.   3/4/2020 at am     ranitidine (ZANTAC) 150 MG tablet Take 150 mg by mouth 2 times daily    3/4/2020 at pm     sertraline (ZOLOFT) 50 MG tablet Take 50 mg by mouth every evening AT 1930.   3/4/2020 at pm     umeclidinium-vilanterol (ANORO ELLIPTA) 62.5-25 MCG/INH oral inhaler Inhale 1 puff into the lungs daily AT 0730.   3/4/2020 at am     WARFARIN SODIUM PO Take 5-7.5 mg by mouth daily Take 7.5mg by mouth every Wednesday at 1930.  Take 5mg by mouth all other days (Sunday, Monday, Tuesday, Thursday, Friday, Saturday) at 1930.   2/29/2020      ALLERGIES:       Allergies   Allergen Reactions     Sulfamethoxazole-Trimethoprim Other (See Comments)      PAST SURGICAL HISTORY:     1.  Cataract removal.     2.  Dilatation and curettage.     3.  Excision of scalp mass.     4.  Intracranial aneurysm repair, left posterior communicating artery in 1998.     5.  Left parotidectomy.     6.  Right posterior communicating artery repair prophylactically in 1998.      SOCIAL HISTORY:  The patient is a former smoker with a 60-pack-year smoking history, but quit this  "past November.  No alcohol or illicit drug use.  Lives in [place], [place], in an apartment with her daughters.      FAMILY HISTORY:  Father with stroke.  Mother with postop infection, and brother with lung cancer.  Sister with uterine cancer.  Sister with emphysema.      LABORATORY DATA:  Hemoglobin 7.8-8.3. INR 1.42, potassium 4.5.      /42 (BP Location: Left arm)   Pulse (!) 49   Temp 96.4  F (35.8  C) (Oral)   Resp 14   Ht 1.575 m (5' 2\")   Wt 90.8 kg (200 lb 3.2 oz)   SpO2 96%   BMI 36.62 kg/m      CONSTITUTIONAL: Pt laying in bed, dressed in hospital garb. Appears comfortable. Cooperative with interview.   HEENT: Normocephalic, atraumatic. Negative for conjunctival redness or scleral icterus.  Oral mucosa pink and moist; negative for ulcerations, erythema, or exudates.  Dentition in good repair.   CARDIOVASCULAR: RRR, no murmurs, rubs, or extra heart sounds appreciated. Pulses +2/4 and regular in upper and lower extremities, bilaterally.   RESPIRATORY: No increased work of breathing.  CTA, bilat; no wheezes, rales, or rhonchi appreciated.  GASTROINTESTINAL:  Abdomen soft, non-distended. BS auscultated in all four quadrants. Negative for tenderness to percussion or light and deep palpation.  No masses or organomegaly noted.  MUSCULOSKELETAL:  No gross deformities noted. Normal muscle tone.   HEMATOLOGIC/LYMPHATIC/IMMUNOLOGIC: Negative for lower extremity edema, bilaterally.  NEUROLOGIC: Alert and oriented to person, place (hospital), and time (2020), forgetful.  strength intact. No focal neuro deficits appreciated on exam   SKIN: Warm, dry, intact. No jaundice noted. Negative for suspicious lesions, rashes, bruising, open sores or abrasions.      ASSESSMENT AND PLAN:  Edna Hilario is an 79-year-old female with past medical history of paroxysmal atrial fibrillation on Coumadin, hypertension, osteoarthritis, GERD, DOMINICK, dementia, and COPD who underwent a robotic total laparoscopic hysterectomy " and bilateral salpingo-oophorectomy with Dr. Bunch for endometrial carcinoma.  Hospitalist Service was consulted for medical comanagement.     Status post robotic total laparoscopic hysterectomy and bilateral salpingo-oophorectomy.   Endometrial carcinoma:  Surgery performed by Dr. Bunch.  General anesthesia used.   -- Defer routine postoperative cares, IV fluids, deep venous thrombosis prophylaxis and pain management to primary team.   -- The patient treated with perioperative Ancef.   -- Defer resumption of his PTA Coumadin to primary service.   -- Pain control with Tylenol p.r.n., oxycodone 5-10 mg q.3. p.r.n., and IV Dilaudid 0.2 mg q.2h. p.r.n., with judicious use of narcotics given patient's age and increased risk for delirium.   -- Bowel regimen in place while on narcotics.     Paroxysmal atrial fibrillation:  Continue PTA Lopressor 25 mg b.i.d.  We will defer resumption of his PTA Coumadin to primary team once deemed appropriate.     Chronic obstructive pulmonary disease with intermittent oxygen use.   Tobacco use disorder, in remission:  Patient with 60-pack-year smoking history.  Known COPD with intermittent oxygen use at home.  We will resume PTA albuterol inhalers, Anoro Ellipta, prednisone.     Obstructive sleep apnea:  CPAP with home settings ordered.     Depression:  Continue PTA Zoloft 50 mg p.o. daily.     Gastroesophageal reflux disease:  Continue PTA Pepcid 20 mg p.o. daily.     Moderate dementia:  Noted in preoperative assessment.  Continue PTA Aricept 10 mg p.o. daily.  The patient is oriented to person, place (hospital), and year.  Forgetful throughout exam.  Cannot tell me specifics regarding medications.   -- Delirium prevention protocol in place.     Deep venous thrombosis prophylaxis:  Defer to primary team, PCDs.      CODE STATUS:  Full code.      The patient was seen and assessed with Dr. Adams of the Hospitalist Service who agrees with the plan as outlined above.         NHI ADAMS MD        As dictated by KOKO GOLD PA-C            D: 2020   T: 2020   MT: WT      Name:     FLAQUITA CARDENAS   MRN:      [id number]        Account:       [id number]   :      1940           Consult Date:  2020      Document: [id number]       cc: Katie Bunch MD

## 2020-03-05 NOTE — ANESTHESIA PREPROCEDURE EVALUATION
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]    Procedure(s):  robotic assisted total laparoscopic hysterectomy, bilateral salpingo-oophorectomy, washings, sentinel lymph node biopsy, omentectomy, possible bilateral pelvic and para-aortic lymphadenectomy    Past Medical History:   Diagnosis Date     Acute respiratory failure (H)      Anemia due to acute blood loss      Ankle edema      Arrhythmia     intermittent at fib     Atrial flutter (H)      COPD (chronic obstructive pulmonary disease) (H)     uses home O2     Disease of lung      Endometrial cancer (H)      Esophageal reflux      Hypertension, essential      Mixed hyperlipidemia      Moderate dementia without behavioral disturbance (H)      Moderate major depression, single episode (H)      Panlobular emphysema (H)      Parotid mass      Prediabetes      Primary osteoarthritis of both knees      Subarachnoid hemorrhage (H)      No past surgical history on file.    Anesthesia Evaluation     . Pt has had prior anesthetic. Type: General    No history of anesthetic complications          ROS/MED HX    ENT/Pulmonary: Comment: Parotid mass    (+)sleep apnea, tobacco use, Past use severe COPD, O2 dependent, uses CPAP , recent URI resolved pneumonia x 2 november: . .    Neurologic: Comment: Previous ruptured cerebral aneurysm s/p craniotomy    (+)dementia,     Cardiovascular: Comment: EJECTION FRACTION 60 - 65%    AORTIC VALVE MEAN PG 8.12 mmHg  LVEDD 4.74 4.2 - 5.8 cm  PEAK TR VELOCITY 2.7 m/s  Other Result Information  This result has an attachment that is not available.  Result Narrative  ECHOCARDIOGRAM      FLAQUITA HILARIO  MRN:    [id number]         Accession#:   [id number]  :    1940 76 years Study Date:   10/11/2016 2:34:17 PM  Gender: F                  BP:           149/45 mmHg  Height: 157.00 cm          BSA:          2.00 m   Weight:  101.00 kg          Tech:         SPK                             Referring MD: NAV LANCE  Site:             ANW  Reading Location: ANW IP      Procedure: 2D, Color Doppler and Spectral Doppler.    Indication for study: Hypotension  Cardiac Rhythm: Normal sinus.Study quality: Fair.    Final Impressions:   1. Normal LV size, borderline wall thickness, normal global systolic function with an estimated EF of 60 - 65%.   2. Right ventricular cavity size is normal, global systolic RV function is normal.   3. The aortic valve is sclerotic and trileaflet, no stenosis and no regurgitation.   4. The mitral valve is normal, trace mitral regurgitation.   5. Estimated pulmonary systolic pressures by tricuspid regurgitation velocity and right atrial pressure are 29 mmHg + RAP.    Chamber Sizes and Function  Normal left ventricular size, borderline wall thickness, normal global systolic function with an estimated EF of 60 - 65%. Left atrial size is normal. Right ventricular cavity size is normal, global systolic RV function is normal. RV wall thickness is normal. The right atrium is normal. Right atrial volume index is 20 ml/m . The pulmonary artery is of normal size and origin. The sinus of Valsalva is normal sized. The ascending aorta is normal sized. The aortic arch is normal.    Valves, RV Pressures and Diastolic Function    The aortic valve is sclerotic and trileaflet, no stenosis and no regurgitation. The mitral valve is normal in structure, trace mitral regurgitation. Mitral annular calcification is present. Indeterminate pattern of LV diastolic filling. The tricuspid valve is normal in structure. Tricuspid regurgitation is mild. The tricuspid regurgitant velocity is 2.7 m/s, the estimated right ventricular systolic pressure is 29 mmHg plus right atrial pressure. There is borderline increased estimated pulmonary pressure by tricuspid regurgitation velocity and right atrial pressure. The pulmonic valve is  normal. No pulmonic regurgitation is present on color flow.    Masses, Effusion, Shunts  There is no pericardial effusion. The inferior vena cava is normal sized. Patient vented, IVC not interpretable. No left to right shunting was detected by limited color flow Doppler interrogation of the interatrial septum.    MEASUREMENTS AND CALCULATIONS    2-D Measurements and LV Function:                  Normal                           Normal  LVID (d) 4.7 cm (<5.8)    LV FS% (2D)   45 %     (>25%)  LVID (s) 2.6 cm           LVOT diameter 2.0 cm   (1.5-2.6)  IVS (d)  1.1 cm (0.7-1.1) HR            78 bpm  LVPW (d) 1.1 cm (0.7-1.1) LA Vol index  25 ml/m2 (<34)  Ao Sinus 3.2 cm (2.4-3.7) RA Vol index  20 ml/m2  Asc Ao   3.8 cm  LA       3.9 cm (1.9-4.0)    Diastology:  Mitral          Tissue Doppler  E Peak 1.7 m/s  e', Septum     0.07 m/s  A Peak 1.3 m/s  e', Lateral    0.12 m/s  E/A    1.3      E/e' Average   17.6  DT     142 msec    Aortic Valve:                    Normal                    Severe  Vmax     2.0 m/s  (<2.0) MADDIE (V)   2.34 cm  (<1.0)  VTI      0.39 m          MADDIE (I)   2.43 cm  (<1.0)  LVOT VTI 0.30 m          Max PG    16 mmHg  SV       95 ml           Mean PG   8 mmHg   (>40)  SV index 48 ml/m         Dim Index 0.77     (<0.25)    Mitral Valve:                    Severe  MVA       5.3 cm  (<1.0)  MV P 1/2  41 msec (>220)  MV Mean G 5 mmHg  (>10)  MV VTI    0.40 m    Tricuspid Valve and estimated PA pressures:  TR Vmax 2.7 m/s  TR maxG 29 mmHg   Pulmonic Valve:  PV AT 94 msec        (+) Dyslipidemia, hypertension----. : . . . :. dysrhythmias a-fib, .      (-) irregular heartbeat/palpitations   METS/Exercise Tolerance:  1 - Eating, dressing   Hematologic:         Musculoskeletal:   (+) arthritis,  -       GI/Hepatic:     (+) GERD Asymptomatic on medication,       Renal/Genitourinary:         Endo:         Psychiatric:     (+) psychiatric history depression      Infectious Disease:         Malignancy:   (+)  "Malignancy   Endometrial sarcoma        Other:                          Physical Exam      Airway   Mallampati: II  TM distance: >3 FB  Neck ROM: full    Dental   (+) upper dentures and lower dentures    Cardiovascular   Rhythm and rate: regular      Pulmonary (+) decreased breath sounds and wheezes               Lab Results   Component Value Date    HGB 7.8 (L) 03/05/2020    POTASSIUM 4.5 03/05/2020    INR 1.42 (H) 03/05/2020       Preop Vitals  BP Readings from Last 3 Encounters:   03/05/20 135/69   02/11/20 139/62    Pulse Readings from Last 3 Encounters:   02/11/20 65      Resp Readings from Last 3 Encounters:   03/05/20 12   02/11/20 14    SpO2 Readings from Last 3 Encounters:   03/05/20 98%   02/11/20 97%      Temp Readings from Last 1 Encounters:   03/05/20 36.3  C (97.4  F) (Temporal)    Ht Readings from Last 1 Encounters:   03/05/20 1.575 m (5' 2\")      Wt Readings from Last 1 Encounters:   03/05/20 90.8 kg (200 lb 3.2 oz)    Estimated body mass index is 36.62 kg/m  as calculated from the following:    Height as of this encounter: 1.575 m (5' 2\").    Weight as of this encounter: 90.8 kg (200 lb 3.2 oz).       Anesthesia Plan      History & Physical Review  History and physical reviewed and following examination; no interval change.    ASA Status:  4 .    NPO Status:  > 8 hours    Plan for General and ETT with Intravenous and Propofol induction. Maintenance will be Balanced.    PONV prophylaxis:  Ondansetron (or other 5HT-3) and Dexamethasone or Solumedrol  Additional equipment: Videolaryngoscope, 2nd IV and Arterial Line No versed      Postoperative Care  Postoperative pain management:  IV analgesics and Oral pain medications.      Consents  Anesthetic plan, risks, benefits and alternatives discussed with:  Patient.  Use of blood products discussed: Yes.   Use of blood products discussed with Patient.  Consented to blood products.  .                 Elia Huddleston MD  "

## 2020-03-05 NOTE — PROCEDURES
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]    Procedures:  Robotic assisted laparoscopic total hysterectomy  Bilateral salphingoophorectomy  Bilateral pelvic sentinel lymph node biopsy  Omental biopsy    Prosthetic Devices:  None    Surgeon(s) and Assistants (if any):  Surgeon(s):  Katie Bunch MD Bonden, Kristan Arellano PA-C  Circulator: Ann Marie Garcia RN; Valerie Fonseca RN  Relief Scrub: Jane De La Vega; Richardson Davis  Scrub Person: Shirley Benton    Anesthesia:  General    Drains:  none    Specimens:  Uterus, cervix, bilateral fallopian tubes and ovaries, bilateral pelvic sentinel lymph nodes, omental biopsy    Complications: none    Findings/Conclusions: Uterus, cervix, ovaries and omentum grossly normal. [place] pelvic lymph nodes identified bilaterally, no suspicious appearing nodes.     Estimated Blood Loss:  5cc    Condition on discharge from OR:  Satisfactory      Kristan Hopper PA-C   On Behalf of  Surgeon(s):  Katie Bunch MD Bonden, Kristan Arellano PA-C

## 2020-03-05 NOTE — ANESTHESIA CARE TRANSFER NOTE
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    Anesthesia Type:   General, ETT     Note:  Airway :Face Mask  Patient transferred to:PACU  Handoff Report: Identifed the Patient, Identified the Reponsible Provider, Reviewed the pertinent medical history, Discussed the surgical course, Reviewed Intra-OP anesthesia mangement and issues during anesthesia, Set expectations for post-procedure period and Allowed opportunity for questions and acknowledgement of understanding      Vitals: (Last set prior to Anesthesia Care Transfer)    CRNA VITALS  3/5/2020 1139 - 3/5/2020 1209      3/5/2020             Resp Rate (set):  10                Electronically Signed By: TRINA Rock CRNA  March 5, 2020  12:09 PM

## 2020-03-05 NOTE — ANESTHESIA POSTPROCEDURE EVALUATION
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    Anesthesia Type:  General, ETT    Note:  Anesthesia Post Evaluation    Patient location during evaluation: PACU  Patient participation: Able to fully participate in evaluation  Level of consciousness: awake, awake and alert and responsive to verbal stimuli  Pain management: adequate  Airway patency: patent  Cardiovascular status: acceptable  Respiratory status: acceptable  Hydration status: acceptable  PONV: none     Anesthetic complications: None          Last vitals:  Vitals:    03/05/20 1330 03/05/20 1350 03/05/20 1415   BP: 126/59  132/49   Pulse: (!) 49     Resp: 20 19 16   Temp:   35.6  C (96.1  F)   SpO2: 94%  90%         Electronically Signed By: Sophia Griffin  March 5, 2020  2:35 PM

## 2020-03-05 NOTE — OP NOTE
Procedure Date: 03/05/2020      PROCEDURE:  Robotic-assisted total laparoscopic hysterectomy, bilateral salpingo-oophorectomy, washings, injection for bilateral sentinel lymph node biopsies and bilateral sentinel lymph node biopsy, as well as laparoscopic omental biopsy.      INDICATIONS FOR THE PROCEDURE:  The patient is an 79-year-old who has a history of atrial fibrillation and is anticoagulated on Coumadin.  She called her PCP due to vaginal bleeding that had soaked the mattress on several occasions.  She was referred to the ER for evaluation.  She was seen in the ER on 12/17/2019.  A pelvic ultrasound revealed a normal size uterus, but a 3.7 cm mass in the central uterus.  She was referred to and evaluated by Abby Abbott.  On 01/15/2020, she underwent a hysteroscopy, D and C and was found to have a large amount of endometrial tissue that was abnormal.  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  A CT scan of the chest, abdomen and pelvis on 02/14/2020 revealed mild mediastinal and bilateral hilar adenopathy, indeterminate ill-defined nodular densities right lower lobe measuring 7 and 6 mm, subcentimeter retroperitoneal lymph nodes of doubtful significance and extensive vascular calcification and ectasia of the aorta.  CA-125 was 44.5 units/mL.  They had been seen in consultation at the [facility] and was seen for a second opinion in my office.  We elected to proceed with robotic-assisted laparoscopic hysterectomy and staging.      FINDINGS:  The patient had no gross evidence of extrauterine disease.  She had macronodular cirrhotic changes of her liver.  Her omentum was very thickened due to her central obesity.  There was no clear peritoneal disease related to her endometrial cancer.  We were able to map bilateral sentinel lymph nodes.  The one on the right-hand side appeared hyperplastic.  The left hand side sentinel lymph node in the obturator  fossa was somewhat worrisome for some disease just in how sticky the lymph nodes were and they were a little bit more difficult to remove it in this area.      PROCEDURE IN DETAIL:  The patient was taken to the operating room.  She was placed in a supine position on a pink pad on the operating room table.  General endotracheal anesthesia was administered in the usual fashion.  She had antibiotic prophylaxis and pneumatic compression stockings had been placed on her lower extremities.  Once she was intubated, she was repositioned in low lithotomy position using well-padded Yellofin stirrups.  An arterial line and extra intravenous catheter were placed by Anesthesia.  At that point her arms were tucked.  She was prepped and draped in the usual sterile fashion.  Shoulder braces were applied to her shoulders and a Moralez was placed above her forehead.  After being draped, a timeout was conducted and everyone agreed upon the procedure.  I started by putting her in 30 degrees of Trendelenburg and marking out where I would put my laparoscopic trocars above the umbilicus.  This was done approximately 20 cm above the symphysis pubis to my best estimate, based on her body habitus.  We then took her out of Trendelenburg.  I placed a Graves speculum in the vagina.  I visualized the cervix.  She had a large cystocele.  I grasped the cervix at 12 o'clock with a tenaculum.  I injected the cervix with diluted ICG dye at 3 o'clock and 9 o'clock at a depth of 1 cm into the cervical stroma and also submucosally in both of those areas.  A total of 5 mL of ICG was injected into the cervix.  The instruments were then removed from her vagina.  An EEA sizer was eventually placed in her vagina and I did place a Garcia at that point.  We then went to the abdomen after a regloving.  I infiltrated the supraumbilical area where I had demarcated the port placement with 0.5% Marcaine with epinephrine.  A small nick was made in the skin with a #15  scalpel blade.  A Veress needle was introduced into the peritoneal cavity.  Opening pressure was 6 mmHg because of the weight of her abdomen.  The abdomen was insufflated with carbon dioxide to create a diffuse pneumoperitoneum.  Pressure limits were set and maintained at or below 15 mmHg throughout the case.  With establishment of pneumoperitoneum, the Veress needle was removed and replaced with an 8 mm da Umberto trocar.  I then, under direct visualization, placed a total of 5 additional port sites.  We placed an 8 mm da Umberto port 8 cm to the right of the camera port in the upper abdomen.  We placed two 8 mm da Umberto ports 8 cm and 16 cm to the left of the camera port in the upper abdomen.  She was then placed in steep Trendelenburg and an 8 mm AirSeal port was placed above the right anterior superior iliac spine.  Because of the redundancy of her bowel and central obesity, we needed to place an extra port 16 cm to the right of the camera port in the upper abdomen.  This was a 12 mm applied Medical port and through this, we were able to place a paddle retractor that was used throughout the hysterectomy to help visualize various structures in the pelvis because of the redundancy of her bowel.  The robot was docked in the usual fashion.  Monopolar scissors were placed in the right upper robotic arm, a [place] bipolar was placed in the medial left upper robotic arm and a ProGrasp was placed in the lateral left robotic arm.  These instruments were advanced into the pelvis.  With the bowel being swept away from the right hand side, the right round ligament was elevated with a ProGrasp, was cauterized with the [place] bipolar and divided with the monopolar scissors.  We then opened up the posterior broad ligament lateral to the ovarian vessels, isolated the ovarian vessels, cauterized them with the Maryland bipolar and divided them with the monopolar scissors.  We undercut the peritoneum towards the uterus.  I opened  up the retroperitoneal spaces including the pararectal and paravesical space.  Applying Firefly technology, I was able to identify a sentinel lymph node in the obturator fossa below the external iliac vein, but above the obturator nerve.  This was dissected out at that point and passed off through 2 spoon graspers and taken out through the accessory port site.  We then opened up the vesicouterine peritoneum, moved the bladder down off the lower uterine segment and upper vagina.  I then went to the left hand side.  I freed up the physiologic adhesions of the sigmoid colon.  I cauterized and divided the round ligament.  I opened up the posterior broad ligament peritoneum, isolated the ovarian vessels.  They were cauterized and divided as we had done on the right hand side.  The peritoneum was undercut underneath the adnexal structures towards the uterus.  The perirectal and perivesical spaces were opened.  Of note, I had clipped off of the uterine arteries on either side at its origin.  On the right hand side, I used a Hem-O-Flex.  On the left hand side, because it was more accessible, I just used the cautery and cauterized the uterine artery.  On either side we identified the ureters.  I applied the Firefly technology and again found a mapping sentinel lymph node in the obturator fossa on the left hand side.  I dissected this free of the external iliac vein and off of the obturator nerve and hypogastric artery.  It was removed with spoon grasper through the accessory port site.  I then went to the left hand side.  I cauterized and divided the soft tissues at the isthmus.  This was done with the Maryland bipolar followed by the monopolar scissors cutting the tissues.  I then went down the cardinal ligament, cauterizing and dividing the main trunk of the uterine artery followed by the soft tissues along the cervix down to and including the uterosacral ligament.  I then repeated this on the left hand side.  The EEA sizer  was pushed into the anterior fornix.  Making sure the bladder was adequately down, I made an anterior colpotomy and circumferentially divided the cervix free of the vagina.  A single-tooth tenaculum was then brought through the colpotomy and was utilized to grasp the cervix.  Cervix, uterus, tubes and ovaries were removed without incident and the EEA sizer with a lap was placed in the vagina.  Because I could not find the omentum, we undocked the robot, flattened it out in position and then placed the camera through one of the ports.  We grasped the omentum, which was quite heavy, and brought it down into the pelvis.  We then redocked the robot, placed her back in 31 degrees of steep Trendelenburg and, using a vessel sealer, took a good portion of the infracolic omentum off.  This was removed through the colpotomy with a ring forceps.  I then again, introducing the paddle retractor, removed the bowel out of the pelvis so I could see it.  We tented up the bladder so we could see the edges of the vagina.  Using a large da Umberto needle  and the Maryland bipolar, we closed the vaginal cuff from the 3 and 9 o'clock angles and did a running full-thickness anterior to posterior vaginal closure incorporating the cul-de-sac, peritoneum and uterosacral ligaments in that closure.  The 2 sutures were tied together in the midline once they had been brought to the midline.  We then irrigated the pelvis.  The saline was re-aspirated and sent off as washings.  The robotic instruments were removed.  The robot was undocked.  The pneumoperitoneum was allowed to dissipate and the trocars were all removed intact.  The incision sites were closed with 4-0 Monocryl in an interrupted fashion to reapproximate the subcutaneous tissue and 4-0 Monocryl in a subcuticular fashion to reapproximate the skin.  Benzoin was placed around the incisions, Steri-Strips laid over the incisions.  The EEA sizer was removed from her vagina.  We left the  Garcia in place until she was more fully awake and had been extubated.  As I left the room, they were working on extubating the patient.      ESTIMATED BLOOD LOSS:  5 mL.        Kristan Hopper was my primary assist.  She helped me with all aspects of the case including trocar placement.  She assisted with docking of the robot, placement of the robotic instruments and their exchange during the case.  She assisted through the accessory port site, providing necessary countertraction and optimizing visualization.  She also managed the paddle retractor to optimize visualization during the procedure.  She was instrumental in specimen retrieval.  She helped with cuff closure and eventually with undocking the robot and port site closure.         CEFERINO FUNES MD             D: 2020   T: 2020   MT: FIDELIA      Name:     FLAQUITA CARDENAS   MRN:      [id number]        Account:        [id number]   :      1940           Procedure Date: 2020      Document: [id number]       cc: Cristina Gonzalez-Mendez MD Ryan Behrens MD

## 2020-03-05 NOTE — PROGRESS NOTES
Medication History Completed by Medication Scribe  Admission medication history interview status for the 3/5/2020  admission is complete. See EPIC admission navigator for prior to admission medications     Medication history sources: Patient, Patient's family/friend (Daughter), Surescripts, H&P and Patient's home med list  Medication history source reliability: Good  Adherence assessment: N/A Not Observed    Significant changes made to the medication list:  None      Additional medication history information:   None    Medication reconciliation completed by provider prior to medication history? No    Time spent in this activity: 35 minutes      Prior to Admission medications    Medication Sig Last Dose Taking? Auth Provider   albuterol (PROAIR HFA/PROVENTIL HFA/VENTOLIN HFA) 108 (90 Base) MCG/ACT inhaler Inhale 2 puffs into the lungs every 4 hours as needed  more than a week at prn Yes Reported, Patient   albuterol (PROVENTIL) (2.5 MG/3ML) 0.083% neb solution Take 2.5 mg by nebulization daily IN THE AFTERNOON. 3/4/2020 at pm Yes Reported, Patient   donepezil (ARICEPT) 10 MG tablet Take 10 mg by mouth every evening AT 1930. 3/4/2020 at pm Yes Reported, Patient   fluconazole (DIFLUCAN) 100 MG tablet Take 100 mg by mouth daily For 14 days 3/4/2020 at Unknown time Yes Reported, Patient   furosemide (LASIX) 20 MG tablet Take 20 mg by mouth once a week AT 0730 ON MONDAYS. 3/2/2020 Yes Reported, Patient   melatonin 3 MG tablet Take 6 mg by mouth At Bedtime AT 1930.  (3MG X 2 = 6MG) 3/4/2020 at pm Yes Reported, Patient   metoprolol tartrate (LOPRESSOR) 50 MG tablet Take 25 mg by mouth 2 times daily (takes 0.5 x 50mg = 25mg) 3/4/2020 at pm Yes Reported, Patient   Nutritional Supplements (ENSURE HIGH PROTEIN) Take 1 Can by mouth every evening AT 1930. 3/4/2020 at Unknown time Yes Reported, Patient   predniSONE (DELTASONE) 1 MG tablet Take 1 mg by mouth every morning AT 0730. 3/4/2020 at am Yes Reported, Patient   ranitidine  (ZANTAC) 150 MG tablet Take 150 mg by mouth 2 times daily  3/4/2020 at pm Yes Reported, Patient   sertraline (ZOLOFT) 50 MG tablet Take 50 mg by mouth every evening AT 1930. 3/4/2020 at pm Yes Reported, Patient   umeclidinium-vilanterol (ANORO ELLIPTA) 62.5-25 MCG/INH oral inhaler Inhale 1 puff into the lungs daily AT 0730. 3/4/2020 at am Yes Reported, Patient   WARFARIN SODIUM PO Take 5-7.5 mg by mouth daily Take 7.5mg by mouth every Wednesday at 1930.  Take 5mg by mouth all other days (Sunday, Monday, Tuesday, Thursday, Friday, Saturday) at 1930. 2/29/2020 Yes Reported, Patient

## 2020-03-06 VITALS
SYSTOLIC BLOOD PRESSURE: 154 MMHG | RESPIRATION RATE: 20 BRPM | TEMPERATURE: 98.7 F | HEART RATE: 49 BPM | OXYGEN SATURATION: 98 % | WEIGHT: 200.2 LBS | HEIGHT: 62 IN | BODY MASS INDEX: 36.84 KG/M2 | DIASTOLIC BLOOD PRESSURE: 50 MMHG

## 2020-03-06 LAB
ANION GAP SERPL CALCULATED.3IONS-SCNC: 3 MMOL/L (ref 3–14)
BUN SERPL-MCNC: 23 MG/DL (ref 7–30)
CALCIUM SERPL-MCNC: 8.7 MG/DL (ref 8.5–10.1)
CHLORIDE SERPL-SCNC: 105 MMOL/L (ref 94–109)
CO2 BLD-SCNC: 26 MMOL/L (ref 21–28)
CO2 SERPL-SCNC: 29 MMOL/L (ref 20–32)
COPATH REPORT: NORMAL
CREAT SERPL-MCNC: 1.02 MG/DL (ref 0.52–1.04)
ERYTHROCYTE [DISTWIDTH] IN BLOOD BY AUTOMATED COUNT: 15.5 % (ref 10–15)
GFR SERPL CREATININE-BSD FRML MDRD: 51 ML/MIN/{1.73_M2}
GLUCOSE SERPL-MCNC: 122 MG/DL (ref 70–99)
HCT VFR BLD AUTO: 28 % (ref 35–47)
HCT VFR BLD CALC: 23 %PCV (ref 35–47)
HGB BLD CALC-MCNC: 7.8 G/DL (ref 11.7–15.7)
HGB BLD-MCNC: 8.5 G/DL (ref 11.7–15.7)
IRON SATN MFR SERPL: 9 % (ref 15–46)
IRON SERPL-MCNC: 32 UG/DL (ref 35–180)
MCH RBC QN AUTO: 23.5 PG (ref 26.5–33)
MCHC RBC AUTO-ENTMCNC: 30.4 G/DL (ref 31.5–36.5)
MCV RBC AUTO: 77 FL (ref 78–100)
PCO2 BLD: 42 MM HG (ref 35–45)
PH BLD: 7.41 PH (ref 7.35–7.45)
PLATELET # BLD AUTO: 177 10E9/L (ref 150–450)
PO2 BLD: 106 MM HG (ref 80–105)
POTASSIUM BLD-SCNC: 4.2 MMOL/L (ref 3.4–5.3)
POTASSIUM SERPL-SCNC: 4.6 MMOL/L (ref 3.4–5.3)
RBC # BLD AUTO: 3.62 10E12/L (ref 3.8–5.2)
SAO2 % BLDA FROM PO2: 98 % (ref 92–100)
SODIUM BLD-SCNC: 138 MMOL/L (ref 133–144)
SODIUM SERPL-SCNC: 137 MMOL/L (ref 133–144)
TIBC SERPL-MCNC: 365 UG/DL (ref 240–430)
WBC # BLD AUTO: 9.6 10E9/L (ref 4–11)

## 2020-03-06 PROCEDURE — 99225 ZZC SUBSEQUENT OBSERVATION CARE,LEVEL II: CPT | Performed by: HOSPITALIST

## 2020-03-06 PROCEDURE — 99207 ZZC CDG-CODE CATEGORY CHANGED: CPT | Performed by: HOSPITALIST

## 2020-03-06 PROCEDURE — 80048 BASIC METABOLIC PNL TOTAL CA: CPT | Performed by: PHYSICIAN ASSISTANT

## 2020-03-06 PROCEDURE — 94640 AIRWAY INHALATION TREATMENT: CPT

## 2020-03-06 PROCEDURE — 25000132 ZZH RX MED GY IP 250 OP 250 PS 637: Performed by: PHYSICIAN ASSISTANT

## 2020-03-06 PROCEDURE — 83540 ASSAY OF IRON: CPT | Performed by: PHYSICIAN ASSISTANT

## 2020-03-06 PROCEDURE — 25000128 H RX IP 250 OP 636: Performed by: PHYSICIAN ASSISTANT

## 2020-03-06 PROCEDURE — 85027 COMPLETE CBC AUTOMATED: CPT | Performed by: PHYSICIAN ASSISTANT

## 2020-03-06 PROCEDURE — 25000132 ZZH RX MED GY IP 250 OP 250 PS 637: Mod: GY | Performed by: PHYSICIAN ASSISTANT

## 2020-03-06 PROCEDURE — 25000131 ZZH RX MED GY IP 250 OP 636 PS 637: Performed by: PHYSICIAN ASSISTANT

## 2020-03-06 PROCEDURE — 36415 COLL VENOUS BLD VENIPUNCTURE: CPT | Performed by: PHYSICIAN ASSISTANT

## 2020-03-06 PROCEDURE — 83550 IRON BINDING TEST: CPT | Performed by: PHYSICIAN ASSISTANT

## 2020-03-06 PROCEDURE — 40000275 ZZH STATISTIC RCP TIME EA 10 MIN

## 2020-03-06 PROCEDURE — 25000125 ZZHC RX 250: Performed by: PHYSICIAN ASSISTANT

## 2020-03-06 RX ADMIN — METOPROLOL TARTRATE 25 MG: 25 TABLET ORAL at 08:26

## 2020-03-06 RX ADMIN — CEFAZOLIN SODIUM 2 G: 2 INJECTION, SOLUTION INTRAVENOUS at 02:12

## 2020-03-06 RX ADMIN — FAMOTIDINE 20 MG: 20 TABLET, FILM COATED ORAL at 08:26

## 2020-03-06 RX ADMIN — Medication 1 LOZENGE: at 05:45

## 2020-03-06 RX ADMIN — FLUCONAZOLE 100 MG: 100 TABLET ORAL at 08:26

## 2020-03-06 RX ADMIN — ALBUTEROL SULFATE 2.5 MG: 2.5 SOLUTION RESPIRATORY (INHALATION) at 07:27

## 2020-03-06 RX ADMIN — PREDNISONE 1 MG: 1 TABLET ORAL at 08:27

## 2020-03-06 RX ADMIN — ONDANSETRON 4 MG: 2 INJECTION INTRAMUSCULAR; INTRAVENOUS at 00:31

## 2020-03-06 RX ADMIN — PROCHLORPERAZINE EDISYLATE 5 MG: 5 INJECTION INTRAMUSCULAR; INTRAVENOUS at 00:56

## 2020-03-06 RX ADMIN — HYDROMORPHONE HYDROCHLORIDE 0.2 MG: 1 INJECTION, SOLUTION INTRAMUSCULAR; INTRAVENOUS; SUBCUTANEOUS at 00:41

## 2020-03-06 NOTE — PROGRESS NOTES
At baseline this AM.  Discussed operative procedure.  Pain controlled.  Incision sites fine.  Needs benitez out this AM and needs to get OOB.  Discharge to home this AM with family.  Please clear discharge with hospitalist service.  She will need to see local MD in 1 wk for evaluation of incision sites and trimming suture material.  Can start coumadin tomorrow and will need INR with local MD at f/u.    A. Leonie Bunch MD  [phone]

## 2020-03-06 NOTE — PLAN OF CARE
A&Ox4. VSS ex on 2.5 L NC. Wears home Cpap at night while sleeping. Tele: Sr with 1 degree AVB. LS clear. Had small emesis overnight/belching. Gave prn zofran x1 and prn compazine x1. Pt c/o of 6/10 incisional pain-gave prn dilaudid x1. BS hypoactive, +flatus. Garcia in place with adequate UOP. 6 lap sites with steri-strips (with some dried blood). Full liquid-AAT, tolerating. Ambulated in hallway this AM with assist of 1 with gait belt and walker.

## 2020-03-06 NOTE — PROGRESS NOTES
"[facility] Progress Note  [facility]    Visit with pt, per request in chart.  Pt was sitting on the edge of her bed, eating breakfast, when I arrived.    She was pleased to tell me that the surgery yesterday went well, and she is planning for discharge home today.  \"They said they got all the cancer, so that's good news,\" she said.    Pt noted good family support and no specific needs.  Provided reflective listening, conversation and support.  SH team available if needs arise.                                                                                                                                               Holley Stephenson M.A.  Staff   Pager [phone]  Phone [phone]      "

## 2020-03-06 NOTE — PROGRESS NOTES
did not get a chance to meet with the patient.  Patient has orders for discharge to home and requires INR follow up.   scheduled the following appointment:    You are scheduled for an INR Draw on Monday 3/9/2020 at the [facility] at 10:05 a.m. located at:   [address] [address] [phone]   This appointment is to check your INR and recommend your coumadin dosing.       Please schedule a follow up with your primary for surgical  follow up including trimming sutures to skin level and removing steri-strips

## 2020-03-06 NOTE — DISCHARGE SUMMARY
Patient discharged at 10:16 AM to home. IV was discontinued. Pain at time of discharge was 0/10. Belongings returned to patient.  Discharge instructions and medications reviewed with patient.  Patient verbalized understanding and all questions were answered.  Prescriptions given to patient.  At time of discharge, patient condition was stable and left the unit via our transport aid on a wheelchair with family at her side.

## 2020-03-06 NOTE — PROGRESS NOTES
[facility]    Medicine Progress Note - Hospitalist Service       Date of Admission:  3/5/2020  Assessment & Plan   Edna Hilario is an 79-year-old woman with PMH significant for  paroxysmal atrial fibrillation on Coumadin, hypertension, osteoarthritis, GERD, DOMINICK, dementia, and COPD who underwent a robotic total laparoscopic hysterectomy and bilateral salpingo-oophorectomy with Dr. Bunch for endometrial carcinoma.  Hospitalist Service was consulted for medical comanagement.      Status post robotic total laparoscopic hysterectomy and bilateral salpingo-oophorectomy.   Endometrial carcinoma:    Routine postoperative cares per primary team  ,-- Defer resumption of his PTA Coumadin to primary service.   -- Pain control with Tylenol p.r.n., oxycodone 5-10 mg q.3. p.r.n.,   and IV Dilaudid  required one dose of Iv dilaudid last night.  -- Bowel regimen in place while on narcotics.   --  Passing Gas , but no BMs yet     Paroxysmal atrial fibrillation:    Continue PTA Lopressor 25 mg b.i.d.    - resumption  PTA Coumadin to primary team once deemed appropriate.      COPD with intermittent oxygen use.   Tobacco use disorder, in remission:    Continue PTA albuterol inhalers, Anoro Ellipta, prednisone.      Obstructive sleep apnea:  CPAP with home settings ordered.      Depression:  Continue PTA Zoloft 50 mg p.o. daily.      Gastroesophageal reflux disease:  Continue PTA Pepcid 20 mg p.o. daily.      Moderate dementia:    Continue PTA Aricept 10 mg p.o. daily.    -- Delirium prevention protocol in place.      Deep venous thrombosis prophylaxis:  Defer to primary team, PCDs.      CODE STATUS:  Full code.       Diet: Regular Diet Adult    DVT Prophylaxis:  Deferred to primary team  Garcia Catheter: in place, indication: /GI/GYN Pelvic Procedure  Code Status: Full Code      Disposition Plan   Expected discharge: 1-2 days, recommended to pending therapy input once adequate pain management/ tolerating PO  medications.  Entered: Mook Chavez MD 03/06/2020, 8:02 AM       The patient's care was discussed with the Bedside Nurse.    Mook Chavez MD  Hospitalist Service  [facility]    ______________________________________________________________________    Interval History   Feels better, pain is adequately controled,passinsg gas, no fever or chills, No other complaints    Data reviewed today: I reviewed all medications, new labs and imaging results over the last 24 hours. I personally reviewed no images or EKG's today.    Physical Exam   Vital Signs: Temp: 98  F (36.7  C) Temp src: Axillary BP: (!) 143/46 Pulse: (!) 49 Heart Rate: 66 Resp: 18 SpO2: 93 % O2 Device: BiPAP/CPAP Oxygen Delivery: 2.5 LPM  Weight: 200 lbs 3.2 oz  General Appearance: Alert in NAD comfortably lying in bed  Respiratory:  CTAa no wheezing or crackles,  Distant breath breath sounds noted, due to body habitus   GI : Soft, positive bowel sounds, no guarding   Skin:  Warm dry no acute rashes  Neuro:  no obvious new focal neurologic defecits    Data   Recent Labs   Lab 03/06/20  0737 03/05/20  1233 03/05/20  1110 03/05/20  0715   WBC 9.6  --   --   --    HGB 8.5* 8.3* 7.8* 7.8*   MCV 77*  --   --   --      --   --   --    INR  --   --   --  1.42*     --  138  --    POTASSIUM 4.6  --  4.2 4.5   CHLORIDE 105  --   --   --    CO2 29  --   --   --    BUN 23  --   --   --    CR 1.02  --   --   --    ANIONGAP 3  --   --   --    LYNDSEY 8.7  --   --   --    *  --   --   --

## 2020-03-06 NOTE — PLAN OF CARE
A&Ox4. VSS ex on 3L and zak at times. POD 0. Garcia from procedure with yellow UOP. 6 lap sites across abdomen with dried steri strips. Denies pain, N/V. SBA, has not gotten OOB this shift. Uses CPAP at night-has her own in the room. Clear diet- ADAT, tolerating currently. Tele: NSR. Continue to monitor.

## 2020-03-09 LAB — COPATH REPORT: NORMAL

## 2020-03-10 ENCOUNTER — MEDICAL CORRESPONDENCE (OUTPATIENT)
Dept: HEALTH INFORMATION MANAGEMENT | Facility: CLINIC | Age: 80
End: 2020-03-10

## 2020-03-23 LAB — LAB SCANNED RESULT: NORMAL

## 2020-03-24 LAB — COPATH REPORT: NORMAL

## (undated) DEVICE — DRAPE CV SPLIT II 147X106" 9158

## (undated) DEVICE — SPONGE RAY-TEC 4X4" 7317

## (undated) DEVICE — ENDO TROCAR FIRST ENTRY KII FIOS Z-THRD 12X100MM CTF73

## (undated) DEVICE — KIT PATIENT POSITIONING PIGAZZI LATEX FREE 40580

## (undated) DEVICE — ESU GROUND PAD UNIVERSAL W/O CORD

## (undated) DEVICE — SOL NACL 0.9% IRRIG 1000ML BOTTLE 2F7124

## (undated) DEVICE — DAVINCI XI DRAPE ARM 470015

## (undated) DEVICE — SU PDS II 0 CT-2 27" Z334H

## (undated) DEVICE — DRSG STERI STRIP 1X5" R1548

## (undated) DEVICE — DAVINCI HOT SHEARS TIP COVER  400180

## (undated) DEVICE — SUCTION IRR STRYKERFLOW II W/TIP 250-070-520

## (undated) DEVICE — DAVINCI XI HANDPIECE ESU VESSEL SEALER 8MM EXT 480422

## (undated) DEVICE — SU MONOCRYL 4-0 PS-2 18" UND Y496G

## (undated) DEVICE — DAVINCI XI DRAPE COLUMN 470341

## (undated) DEVICE — SPONGE LAP 18X18" X8435

## (undated) DEVICE — DAVINCI XI OBTURATOR BLADELESS 8MM 470359

## (undated) DEVICE — LINEN TOWEL PACK X5 5464

## (undated) DEVICE — SUCTION CANISTER MEDIVAC LINER 3000ML W/LID 65651-530

## (undated) DEVICE — DAVINCI XI SEAL UNIVERSAL 5-8MM 470361

## (undated) DEVICE — SOL NACL 0.9% INJ 1000ML BAG 2B1324X

## (undated) DEVICE — CLIP ENDO HEMO-LOC PURPLE LG 544240

## (undated) DEVICE — GLOVE BIOGEL PI ULTRATOUCH G SZ 6.5 42165

## (undated) DEVICE — PACK DAVINCI GYN SMA15GDFS1

## (undated) DEVICE — SOL WATER IRRIG 1000ML BOTTLE 2F7114

## (undated) DEVICE — TUBING CONMED AIRSEAL SMOKE EVAC INSUFFLATION ASM-EVAC

## (undated) DEVICE — SOL BENZOIN 0.5OZ

## (undated) DEVICE — CATH TRAY FOLEY SURESTEP 16FR WDRAIN BAG STLK LATEX A300316A

## (undated) DEVICE — ENDO RETRACTOR PADDLE 12MM  173046

## (undated) RX ORDER — CEFAZOLIN SODIUM 2 G/100ML
INJECTION, SOLUTION INTRAVENOUS
Status: DISPENSED
Start: 2020-03-05

## (undated) RX ORDER — BUPIVACAINE HYDROCHLORIDE AND EPINEPHRINE 5; 5 MG/ML; UG/ML
INJECTION, SOLUTION EPIDURAL; INTRACAUDAL; PERINEURAL
Status: DISPENSED
Start: 2020-03-05

## (undated) RX ORDER — INDOCYANINE GREEN AND WATER 25 MG
KIT INJECTION
Status: DISPENSED
Start: 2020-03-05

## (undated) RX ORDER — GLYCOPYRROLATE 0.2 MG/ML
INJECTION, SOLUTION INTRAMUSCULAR; INTRAVENOUS
Status: DISPENSED
Start: 2020-03-05

## (undated) RX ORDER — HYDROMORPHONE HYDROCHLORIDE 1 MG/ML
INJECTION, SOLUTION INTRAMUSCULAR; INTRAVENOUS; SUBCUTANEOUS
Status: DISPENSED
Start: 2020-03-05

## (undated) RX ORDER — LIDOCAINE HYDROCHLORIDE 20 MG/ML
INJECTION, SOLUTION EPIDURAL; INFILTRATION; INTRACAUDAL; PERINEURAL
Status: DISPENSED
Start: 2020-03-05

## (undated) RX ORDER — FENTANYL CITRATE 50 UG/ML
INJECTION, SOLUTION INTRAMUSCULAR; INTRAVENOUS
Status: DISPENSED
Start: 2020-03-05

## (undated) RX ORDER — ONDANSETRON 2 MG/ML
INJECTION INTRAMUSCULAR; INTRAVENOUS
Status: DISPENSED
Start: 2020-03-05

## (undated) RX ORDER — ACETAMINOPHEN 325 MG/1
TABLET ORAL
Status: DISPENSED
Start: 2020-03-05

## (undated) RX ORDER — DEXAMETHASONE SODIUM PHOSPHATE 4 MG/ML
INJECTION, SOLUTION INTRA-ARTICULAR; INTRALESIONAL; INTRAMUSCULAR; INTRAVENOUS; SOFT TISSUE
Status: DISPENSED
Start: 2020-03-05